# Patient Record
Sex: MALE | Race: WHITE | Employment: FULL TIME | URBAN - METROPOLITAN AREA
[De-identification: names, ages, dates, MRNs, and addresses within clinical notes are randomized per-mention and may not be internally consistent; named-entity substitution may affect disease eponyms.]

---

## 2017-05-24 ENCOUNTER — OFFICE VISIT (OUTPATIENT)
Dept: FAMILY MEDICINE CLINIC | Age: 57
End: 2017-05-24

## 2017-05-24 VITALS
HEART RATE: 66 BPM | WEIGHT: 209.6 LBS | SYSTOLIC BLOOD PRESSURE: 126 MMHG | HEIGHT: 70 IN | BODY MASS INDEX: 30.01 KG/M2 | DIASTOLIC BLOOD PRESSURE: 80 MMHG

## 2017-05-24 DIAGNOSIS — Z13.1 DIABETES MELLITUS SCREENING: ICD-10-CM

## 2017-05-24 DIAGNOSIS — Z13.220 LIPID SCREENING: ICD-10-CM

## 2017-05-24 DIAGNOSIS — Z00.00 ROUTINE PHYSICAL EXAMINATION: Primary | ICD-10-CM

## 2017-05-24 DIAGNOSIS — Z12.5 PROSTATE CANCER SCREENING: ICD-10-CM

## 2017-05-24 PROCEDURE — 99396 PREV VISIT EST AGE 40-64: CPT | Performed by: FAMILY MEDICINE

## 2017-05-24 ASSESSMENT — ENCOUNTER SYMPTOMS
ALLERGIC/IMMUNOLOGIC NEGATIVE: 1
GASTROINTESTINAL NEGATIVE: 1
EYES NEGATIVE: 1
RESPIRATORY NEGATIVE: 1

## 2017-10-24 DIAGNOSIS — Z13.1 DIABETES MELLITUS SCREENING: ICD-10-CM

## 2017-10-24 DIAGNOSIS — Z13.220 LIPID SCREENING: ICD-10-CM

## 2017-10-24 DIAGNOSIS — Z00.00 ROUTINE PHYSICAL EXAMINATION: ICD-10-CM

## 2017-10-24 LAB
A/G RATIO: 2.1 (ref 1.1–2.2)
ALBUMIN SERPL-MCNC: 4.6 G/DL (ref 3.4–5)
ALP BLD-CCNC: 53 U/L (ref 40–129)
ALT SERPL-CCNC: 19 U/L (ref 10–40)
ANION GAP SERPL CALCULATED.3IONS-SCNC: 14 MMOL/L (ref 3–16)
AST SERPL-CCNC: 22 U/L (ref 15–37)
BASOPHILS ABSOLUTE: 0 K/UL (ref 0–0.2)
BASOPHILS RELATIVE PERCENT: 0.6 %
BILIRUB SERPL-MCNC: 0.6 MG/DL (ref 0–1)
BUN BLDV-MCNC: 10 MG/DL (ref 7–20)
CALCIUM SERPL-MCNC: 9.1 MG/DL (ref 8.3–10.6)
CHLORIDE BLD-SCNC: 100 MMOL/L (ref 99–110)
CHOLESTEROL, TOTAL: 190 MG/DL (ref 0–199)
CO2: 28 MMOL/L (ref 21–32)
CREAT SERPL-MCNC: 0.9 MG/DL (ref 0.9–1.3)
EOSINOPHILS ABSOLUTE: 0.1 K/UL (ref 0–0.6)
EOSINOPHILS RELATIVE PERCENT: 0.9 %
GFR AFRICAN AMERICAN: >60
GFR NON-AFRICAN AMERICAN: >60
GLOBULIN: 2.2 G/DL
GLUCOSE BLD-MCNC: 76 MG/DL (ref 70–99)
HCT VFR BLD CALC: 45.1 % (ref 40.5–52.5)
HDLC SERPL-MCNC: 64 MG/DL (ref 40–60)
HEMOGLOBIN: 15.8 G/DL (ref 13.5–17.5)
LDL CHOLESTEROL CALCULATED: 107 MG/DL
LYMPHOCYTES ABSOLUTE: 1.7 K/UL (ref 1–5.1)
LYMPHOCYTES RELATIVE PERCENT: 25.8 %
MCH RBC QN AUTO: 32.5 PG (ref 26–34)
MCHC RBC AUTO-ENTMCNC: 35 G/DL (ref 31–36)
MCV RBC AUTO: 93 FL (ref 80–100)
MONOCYTES ABSOLUTE: 0.5 K/UL (ref 0–1.3)
MONOCYTES RELATIVE PERCENT: 7 %
NEUTROPHILS ABSOLUTE: 4.3 K/UL (ref 1.7–7.7)
NEUTROPHILS RELATIVE PERCENT: 65.7 %
PDW BLD-RTO: 13.4 % (ref 12.4–15.4)
PLATELET # BLD: 176 K/UL (ref 135–450)
PMV BLD AUTO: 7.5 FL (ref 5–10.5)
POTASSIUM SERPL-SCNC: 4.3 MMOL/L (ref 3.5–5.1)
PROSTATE SPECIFIC ANTIGEN: 0.6 NG/ML (ref 0–4)
RBC # BLD: 4.85 M/UL (ref 4.2–5.9)
SODIUM BLD-SCNC: 142 MMOL/L (ref 136–145)
TOTAL PROTEIN: 6.8 G/DL (ref 6.4–8.2)
TRIGL SERPL-MCNC: 95 MG/DL (ref 0–150)
TSH REFLEX: 2.69 UIU/ML (ref 0.27–4.2)
VLDLC SERPL CALC-MCNC: 19 MG/DL
WBC # BLD: 6.5 K/UL (ref 4–11)

## 2017-10-25 LAB
ESTIMATED AVERAGE GLUCOSE: 85.3 MG/DL
HBA1C MFR BLD: 4.6 %

## 2019-06-19 ENCOUNTER — OFFICE VISIT (OUTPATIENT)
Dept: FAMILY MEDICINE CLINIC | Age: 59
End: 2019-06-19
Payer: COMMERCIAL

## 2019-06-19 VITALS
BODY MASS INDEX: 30.83 KG/M2 | SYSTOLIC BLOOD PRESSURE: 138 MMHG | DIASTOLIC BLOOD PRESSURE: 80 MMHG | WEIGHT: 211.8 LBS | HEART RATE: 79 BPM | OXYGEN SATURATION: 99 %

## 2019-06-19 DIAGNOSIS — Z12.11 COLON CANCER SCREENING: ICD-10-CM

## 2019-06-19 DIAGNOSIS — H57.9 EYE DISORDER: Primary | ICD-10-CM

## 2019-06-19 PROCEDURE — 99213 OFFICE O/P EST LOW 20 MIN: CPT | Performed by: FAMILY MEDICINE

## 2019-06-19 ASSESSMENT — ENCOUNTER SYMPTOMS
ALLERGIC/IMMUNOLOGIC NEGATIVE: 1
GASTROINTESTINAL NEGATIVE: 1
RESPIRATORY NEGATIVE: 1

## 2019-06-19 ASSESSMENT — PATIENT HEALTH QUESTIONNAIRE - PHQ9
SUM OF ALL RESPONSES TO PHQ9 QUESTIONS 1 & 2: 0
1. LITTLE INTEREST OR PLEASURE IN DOING THINGS: 0
SUM OF ALL RESPONSES TO PHQ QUESTIONS 1-9: 0
SUM OF ALL RESPONSES TO PHQ QUESTIONS 1-9: 0
2. FEELING DOWN, DEPRESSED OR HOPELESS: 0

## 2019-06-19 NOTE — PROGRESS NOTES
SUBJECTIVE:  Patient ID: Elroy Templeton is a 63 y.o. male. Chief Complaint:  Chief Complaint   Patient presents with    Eye Drainage     watery left side x few months       HPI   63year old Male  Tear Left eye several week    No past medical history on file. 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 No current outpatient medications on file. No current facility-administered medications for this visit.       Lab Results   Component Value Date    WBC 6.5 10/24/2017    HGB 15.8 10/24/2017    HCT 45.1 10/24/2017    MCV 93.0 10/24/2017     10/24/2017     Lab Results   Component Value Date    CHOL 190 10/24/2017     Lab Results   Component Value Date    TRIG 95 10/24/2017     Lab Results   Component Value Date    HDL 64 (H) 10/24/2017     Lab Results   Component Value Date    LDLCALC 107 (H) 10/24/2017     Lab Results   Component Value Date    LABVLDL 19 10/24/2017     No results found for: [facility]    Chemistry        Component Value Date/Time     10/24/2017 1600    K 4.3 10/24/2017 1600     10/24/2017 1600    CO2 28 10/24/2017 1600    BUN 10 10/24/2017 1600    CREATININE 0.9 10/24/2017 1600        Component Value Date/Time    CALCIUM 9.1 10/24/2017 1600    ALKPHOS 53 10/24/2017 1600    AST 22 10/24/2017 1600    ALT 19 10/24/2017 1600    BILITOT 0.6 10/24/2017 1600          No results found for: TSHFT4, TSH  Lab Results   Component Value Date    PSA 0.60 10/24/2017     Lab Results   Component Value Date    LABA1C 4.6 10/24/2017     Lab Results   Component Value Date    EAG 85.3 10/24/2017         Review of Systems   Constitutional: Negative. HENT: Negative. Eyes:        Left eye +tearful   Respiratory: Negative. Cardiovascular: Negative. Gastrointestinal: Negative. Endocrine: Negative. Genitourinary: Negative. Allergic/Immunologic: Negative. Negative for environmental allergies. Neurological: Negative. Hematological: Negative. OBJECTIVE:  /80 (Site: Left Upper Arm, Position: Sitting, Cuff Size: Medium Adult)   Pulse 79   Wt 211 lb 12.8 oz (96.1 kg)   SpO2 99%   BMI 30.83 kg/m²   Physical Exam   Constitutional: He appears well-developed and well-nourished. No distress. HENT:   Head: Normocephalic. Eyes: Pupils are equal, round, and reactive to light. EOM are normal.   Watery Lt eye    Skin: He is not diaphoretic. Vitals reviewed. ASSESSMENT/PLAN:      Diagnosis Orders   1. Eye disorder     2. Colon cancer screening  CHITO Bell MD, Gastroenterology, [facility] Dr Jacquelin Ling    1.  Colon cancer screening Z12.11 CHITO Bell MD, Gastroenterology, [facility]

## 2020-08-12 ENCOUNTER — OFFICE VISIT (OUTPATIENT)
Dept: PRIMARY CARE CLINIC | Age: 60
End: 2020-08-12
Payer: COMMERCIAL

## 2020-08-12 VITALS
BODY MASS INDEX: 30.39 KG/M2 | HEART RATE: 73 BPM | RESPIRATION RATE: 14 BRPM | TEMPERATURE: 98.1 F | DIASTOLIC BLOOD PRESSURE: 87 MMHG | OXYGEN SATURATION: 98 % | WEIGHT: 208.8 LBS | SYSTOLIC BLOOD PRESSURE: 147 MMHG

## 2020-08-12 DIAGNOSIS — Z13.1 DIABETES MELLITUS SCREENING: ICD-10-CM

## 2020-08-12 DIAGNOSIS — Z00.00 ROUTINE PHYSICAL EXAMINATION: ICD-10-CM

## 2020-08-12 DIAGNOSIS — R19.7 DIARRHEA, UNSPECIFIED TYPE: ICD-10-CM

## 2020-08-12 DIAGNOSIS — Z13.220 LIPID SCREENING: ICD-10-CM

## 2020-08-12 DIAGNOSIS — Z12.5 PROSTATE CANCER SCREENING: ICD-10-CM

## 2020-08-12 DIAGNOSIS — E55.9 VITAMIN D DEFICIENCY: ICD-10-CM

## 2020-08-12 DIAGNOSIS — K58.0 IRRITABLE BOWEL SYNDROME WITH DIARRHEA: ICD-10-CM

## 2020-08-12 PROCEDURE — 99396 PREV VISIT EST AGE 40-64: CPT | Performed by: FAMILY MEDICINE

## 2020-08-12 ASSESSMENT — ENCOUNTER SYMPTOMS
CHEST TIGHTNESS: 0
CHOKING: 0
RHINORRHEA: 0
ABDOMINAL DISTENTION: 0
CONSTIPATION: 0
EYE DISCHARGE: 0
VOICE CHANGE: 0
BLOOD IN STOOL: 0
SINUS PAIN: 0
NAUSEA: 0
SHORTNESS OF BREATH: 0
PHOTOPHOBIA: 0
ALLERGIC/IMMUNOLOGIC NEGATIVE: 1
WHEEZING: 0
EYE ITCHING: 0
EYES NEGATIVE: 1
BACK PAIN: 1
TROUBLE SWALLOWING: 0
SINUS PRESSURE: 0
ABDOMINAL PAIN: 1
COLOR CHANGE: 0
SORE THROAT: 0
EYE REDNESS: 0
DIARRHEA: 1
APNEA: 0
COUGH: 0
EYE PAIN: 0
VOMITING: 0

## 2020-08-12 ASSESSMENT — PATIENT HEALTH QUESTIONNAIRE - PHQ9
1. LITTLE INTEREST OR PLEASURE IN DOING THINGS: 0
SUM OF ALL RESPONSES TO PHQ QUESTIONS 1-9: 0
SUM OF ALL RESPONSES TO PHQ QUESTIONS 1-9: 0
2. FEELING DOWN, DEPRESSED OR HOPELESS: 0
SUM OF ALL RESPONSES TO PHQ9 QUESTIONS 1 & 2: 0

## 2020-08-12 NOTE — PROGRESS NOTES
SUBJECTIVE:  Patient ID: Min Baumann is a 62 y.o. male. Chief Complaint:  Chief Complaint   Patient presents with    Abdominal Pain     gastro referral     Annual Exam       HPI   49year old Male   Annual  C/O abdominal discomfort & some diarrhea x few weeks    No past medical history on file. 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 No current outpatient medications on file. No current facility-administered medications for this visit. Lab Results   Component Value Date    WBC 6.5 10/24/2017    HGB 15.8 10/24/2017    HCT 45.1 10/24/2017    MCV 93.0 10/24/2017     10/24/2017     Lab Results   Component Value Date    CHOL 190 10/24/2017     Lab Results   Component Value Date    TRIG 95 10/24/2017     Lab Results   Component Value Date    HDL 64 (H) 10/24/2017     Lab Results   Component Value Date    LDLCALC 107 (H) 10/24/2017     Lab Results   Component Value Date    LABVLDL 19 10/24/2017     No results found for: [facility]    Chemistry        Component Value Date/Time     10/24/2017 1600    K 4.3 10/24/2017 1600     10/24/2017 1600    CO2 28 10/24/2017 1600    BUN 10 10/24/2017 1600    CREATININE 0.9 10/24/2017 1600        Component Value Date/Time    CALCIUM 9.1 10/24/2017 1600    ALKPHOS 53 10/24/2017 1600    AST 22 10/24/2017 1600    ALT 19 10/24/2017 1600    BILITOT 0.6 10/24/2017 1600            Review of Systems   Constitutional: Negative for activity change, appetite change, chills, diaphoresis, fatigue, fever and unexpected weight change. Activity  GYM  walk   HENT: Negative. Negative for congestion, ear discharge, ear pain, hearing loss, nosebleeds, postnasal drip, rhinorrhea, sinus pressure, sinus pain, sore throat, tinnitus, trouble swallowing and voice change. Eyes: Negative. Negative for photophobia, pain, discharge, redness, itching and visual disturbance. Respiratory: Negative for apnea, cough, choking, chest tightness, shortness of breath and wheezing. No Tobacco   Cardiovascular: Negative. Negative for chest pain, palpitations and leg swelling. Gastrointestinal: Positive for abdominal pain and diarrhea. Negative for abdominal distention, blood in stool, constipation, nausea and vomiting. Stomach issues  GI Dr Maria Isabel Hernández    Endocrine: Negative. Negative for cold intolerance, heat intolerance, polydipsia, polyphagia and polyuria. Genitourinary: Negative for dysuria, flank pain, frequency and urgency. Musculoskeletal: Positive for back pain. Negative for gait problem and neck pain. Hx Knee scope  Back   MRI stenosis ,arthritis ,scoliosis  Exercise   Skin: Negative for color change and rash. Allergic/Immunologic: Negative. Negative for environmental allergies and food allergies. Neurological: Negative for dizziness, tremors, light-headedness, numbness and headaches. Hematological: Negative. Psychiatric/Behavioral: Negative for agitation, behavioral problems, confusion, decreased concentration, self-injury and sleep disturbance. The patient is not nervous/anxious and is not hyperactive. OBJECTIVE:  BP (!) 147/87   Pulse 73   Temp 98.1 °F (36.7 °C) (Temporal)   Resp 14   Wt 208 lb 12.8 oz (94.7 kg)   SpO2 98%   BMI 30.39 kg/m²   Physical Exam  Vitals signs reviewed. Constitutional:       Appearance: He is well-developed. HENT:      Head: Normocephalic.       Right Ear: External ear normal.      Left Ear: External ear normal.      Nose: Nose normal.      Mouth/Throat:      Pharynx: No oropharyngeal exudate. Eyes:      Conjunctiva/sclera: Conjunctivae normal.      Pupils: Pupils are equal, round, and reactive to light. Neck:      Musculoskeletal: Normal range of motion and neck supple. Cardiovascular:      Rate and Rhythm: Normal rate and regular rhythm. Heart sounds: Normal heart sounds. No murmur. Pulmonary:      Effort: Pulmonary effort is normal.      Breath sounds: Normal breath sounds. Abdominal:      General: Bowel sounds are normal. There is no distension. Palpations: Abdomen is soft. There is no mass. Tenderness: There is no abdominal tenderness. There is no left CVA tenderness, guarding or rebound. Hernia: No hernia is present. Musculoskeletal: Normal range of motion. Skin:     General: Skin is warm. Neurological:      Mental Status: He is alert and oriented to person, place, and time. Deep Tendon Reflexes: Reflexes are normal and symmetric. Psychiatric:         Behavior: Behavior normal.         Thought Content: Thought content normal.         Judgment: Judgment normal.         ASSESSMENT/PLAN:      Diagnosis Orders   1. Routine physical examination  CBC Auto Differential    Comprehensive Metabolic Panel    Hemoglobin A1C    Lipid Panel    TSH without Reflex    Vitamin D 25 Hydroxy    Psa screening   2. Irritable bowel syndrome with diarrhea  CHITO - Devorah Meza MD, Gastroenterology, [facility]    CBC Auto Differential    Comprehensive Metabolic Panel    Amylase    Lipase   3. Vitamin D deficiency  Vitamin D 25 Hydroxy   4. Prostate cancer screening  Psa screening   5. Lipid screening  Lipid Panel   6. Diabetes mellitus screening  Hemoglobin A1C   7.  Diarrhea, unspecified type  CBC Auto Differential    Comprehensive Metabolic Panel    Amylase    Lipase     As above   BP recheck by MA

## 2020-08-13 LAB
A/G RATIO: 2 (ref 1.1–2.2)
ALBUMIN SERPL-MCNC: 4.7 G/DL (ref 3.4–5)
ALP BLD-CCNC: 47 U/L (ref 40–129)
ALT SERPL-CCNC: 22 U/L (ref 10–40)
AMYLASE: 69 U/L (ref 25–115)
ANION GAP SERPL CALCULATED.3IONS-SCNC: 13 MMOL/L (ref 3–16)
AST SERPL-CCNC: 25 U/L (ref 15–37)
BASOPHILS ABSOLUTE: 0.1 K/UL (ref 0–0.2)
BASOPHILS RELATIVE PERCENT: 1 %
BILIRUB SERPL-MCNC: 0.7 MG/DL (ref 0–1)
BUN BLDV-MCNC: 13 MG/DL (ref 7–20)
CALCIUM SERPL-MCNC: 9.6 MG/DL (ref 8.3–10.6)
CHLORIDE BLD-SCNC: 101 MMOL/L (ref 99–110)
CHOLESTEROL, TOTAL: 182 MG/DL (ref 0–199)
CO2: 27 MMOL/L (ref 21–32)
CREAT SERPL-MCNC: 1.1 MG/DL (ref 0.9–1.3)
EOSINOPHILS ABSOLUTE: 0.1 K/UL (ref 0–0.6)
EOSINOPHILS RELATIVE PERCENT: 0.8 %
ESTIMATED AVERAGE GLUCOSE: 93.9 MG/DL
GFR AFRICAN AMERICAN: >60
GFR NON-AFRICAN AMERICAN: >60
GLOBULIN: 2.3 G/DL
GLUCOSE BLD-MCNC: 95 MG/DL (ref 70–99)
HBA1C MFR BLD: 4.9 %
HCT VFR BLD CALC: 46.6 % (ref 40.5–52.5)
HDLC SERPL-MCNC: 75 MG/DL (ref 40–60)
HEMOGLOBIN: 16 G/DL (ref 13.5–17.5)
LDL CHOLESTEROL CALCULATED: 92 MG/DL
LIPASE: 41 U/L (ref 13–60)
LYMPHOCYTES ABSOLUTE: 1.9 K/UL (ref 1–5.1)
LYMPHOCYTES RELATIVE PERCENT: 25.9 %
MCH RBC QN AUTO: 31.6 PG (ref 26–34)
MCHC RBC AUTO-ENTMCNC: 34.3 G/DL (ref 31–36)
MCV RBC AUTO: 92.3 FL (ref 80–100)
MONOCYTES ABSOLUTE: 0.5 K/UL (ref 0–1.3)
MONOCYTES RELATIVE PERCENT: 7.1 %
NEUTROPHILS ABSOLUTE: 4.7 K/UL (ref 1.7–7.7)
NEUTROPHILS RELATIVE PERCENT: 65.2 %
PDW BLD-RTO: 13.5 % (ref 12.4–15.4)
PLATELET # BLD: 168 K/UL (ref 135–450)
PMV BLD AUTO: 7.3 FL (ref 5–10.5)
POTASSIUM SERPL-SCNC: 4.1 MMOL/L (ref 3.5–5.1)
PROSTATE SPECIFIC ANTIGEN: 0.67 NG/ML (ref 0–4)
RBC # BLD: 5.05 M/UL (ref 4.2–5.9)
SODIUM BLD-SCNC: 141 MMOL/L (ref 136–145)
TOTAL PROTEIN: 7 G/DL (ref 6.4–8.2)
TRIGL SERPL-MCNC: 73 MG/DL (ref 0–150)
TSH SERPL DL<=0.05 MIU/L-ACNC: 2.78 UIU/ML (ref 0.27–4.2)
VITAMIN D 25-HYDROXY: 59 NG/ML
VLDLC SERPL CALC-MCNC: 15 MG/DL
WBC # BLD: 7.3 K/UL (ref 4–11)

## 2021-04-15 ENCOUNTER — TELEPHONE (OUTPATIENT)
Dept: PRIMARY CARE CLINIC | Age: 61
End: 2021-04-15

## 2021-04-15 NOTE — TELEPHONE ENCOUNTER
Patient has hurt his back and is hoping that he can get a script for some steriods for the pain.      Thank you         [facility] [address], [address] [place] [address] [phone]

## 2021-09-16 ENCOUNTER — PATIENT MESSAGE (OUTPATIENT)
Dept: PRIMARY CARE CLINIC | Age: 61
End: 2021-09-16

## 2021-09-16 NOTE — TELEPHONE ENCOUNTER
From: Miriam Coleman  To: Lizzie William MD  Sent: 9/16/2021 10:01 AM EDT  Subject: Test Results Question    Dr. Philip Livers all is well. I have had 2 CT scans in the last month. Both confirming a kidney stone. The scan also showed a couple of other results that I have questions about. Is there a good time for a call to discuss?     Thank you,  Sylvie Wright  [phone]

## 2021-09-17 PROBLEM — M51.9 LUMBAR DISC DISEASE: Status: ACTIVE | Noted: 2021-08-17

## 2021-09-17 PROBLEM — N20.0 KIDNEY STONE: Status: ACTIVE | Noted: 2021-08-17

## 2021-09-17 PROBLEM — K57.30 COLON, DIVERTICULOSIS: Status: ACTIVE | Noted: 2021-08-17

## 2021-09-20 ENCOUNTER — OFFICE VISIT (OUTPATIENT)
Dept: PRIMARY CARE CLINIC | Age: 61
End: 2021-09-20
Payer: COMMERCIAL

## 2021-09-20 VITALS
DIASTOLIC BLOOD PRESSURE: 86 MMHG | BODY MASS INDEX: 27.38 KG/M2 | HEART RATE: 57 BPM | TEMPERATURE: 98.3 F | HEIGHT: 71 IN | SYSTOLIC BLOOD PRESSURE: 152 MMHG | OXYGEN SATURATION: 100 % | WEIGHT: 195.6 LBS

## 2021-09-20 DIAGNOSIS — Z00.00 ROUTINE PHYSICAL EXAMINATION: Primary | ICD-10-CM

## 2021-09-20 DIAGNOSIS — Z13.1 DIABETES MELLITUS SCREENING: ICD-10-CM

## 2021-09-20 DIAGNOSIS — Z13.220 LIPID SCREENING: ICD-10-CM

## 2021-09-20 DIAGNOSIS — Z12.5 PROSTATE CANCER SCREENING: ICD-10-CM

## 2021-09-20 DIAGNOSIS — N20.0 KIDNEY STONE: ICD-10-CM

## 2021-09-20 DIAGNOSIS — M89.8X5 LYTIC BONE LESION OF HIP: ICD-10-CM

## 2021-09-20 PROCEDURE — 99396 PREV VISIT EST AGE 40-64: CPT | Performed by: FAMILY MEDICINE

## 2021-09-20 RX ORDER — TAMSULOSIN HYDROCHLORIDE 0.4 MG/1
0.4 CAPSULE ORAL NIGHTLY
COMMUNITY
Start: 2021-08-25

## 2021-09-20 RX ORDER — ONDANSETRON 8 MG/1
8 TABLET, ORALLY DISINTEGRATING ORAL EVERY 8 HOURS PRN
COMMUNITY
Start: 2021-08-02

## 2021-09-20 SDOH — ECONOMIC STABILITY: TRANSPORTATION INSECURITY
IN THE PAST 12 MONTHS, HAS LACK OF TRANSPORTATION KEPT YOU FROM MEETINGS, WORK, OR FROM GETTING THINGS NEEDED FOR DAILY LIVING?: NO

## 2021-09-20 SDOH — ECONOMIC STABILITY: HOUSING INSECURITY: IN THE LAST 12 MONTHS, HOW MANY PLACES HAVE YOU LIVED?: 1

## 2021-09-20 SDOH — ECONOMIC STABILITY: TRANSPORTATION INSECURITY
IN THE PAST 12 MONTHS, HAS THE LACK OF TRANSPORTATION KEPT YOU FROM MEDICAL APPOINTMENTS OR FROM GETTING MEDICATIONS?: NO

## 2021-09-20 SDOH — ECONOMIC STABILITY: FOOD INSECURITY: WITHIN THE PAST 12 MONTHS, THE FOOD YOU BOUGHT JUST DIDN'T LAST AND YOU DIDN'T HAVE MONEY TO GET MORE.: NEVER TRUE

## 2021-09-20 SDOH — ECONOMIC STABILITY: HOUSING INSECURITY
IN THE LAST 12 MONTHS, WAS THERE A TIME WHEN YOU DID NOT HAVE A STEADY PLACE TO SLEEP OR SLEPT IN A SHELTER (INCLUDING NOW)?: NO

## 2021-09-20 SDOH — HEALTH STABILITY: PHYSICAL HEALTH: ON AVERAGE, HOW MANY DAYS PER WEEK DO YOU ENGAGE IN MODERATE TO STRENUOUS EXERCISE (LIKE A BRISK WALK)?: 3 DAYS

## 2021-09-20 SDOH — HEALTH STABILITY: PHYSICAL HEALTH: ON AVERAGE, HOW MANY MINUTES DO YOU ENGAGE IN EXERCISE AT THIS LEVEL?: 50 MIN

## 2021-09-20 SDOH — ECONOMIC STABILITY: FOOD INSECURITY: WITHIN THE PAST 12 MONTHS, YOU WORRIED THAT YOUR FOOD WOULD RUN OUT BEFORE YOU GOT MONEY TO BUY MORE.: NEVER TRUE

## 2021-09-20 SDOH — ECONOMIC STABILITY: INCOME INSECURITY: IN THE LAST 12 MONTHS, WAS THERE A TIME WHEN YOU WERE NOT ABLE TO PAY THE MORTGAGE OR RENT ON TIME?: NO

## 2021-09-20 ASSESSMENT — SOCIAL DETERMINANTS OF HEALTH (SDOH)
HOW HARD IS IT FOR YOU TO PAY FOR THE VERY BASICS LIKE FOOD, HOUSING, MEDICAL CARE, AND HEATING?: NOT HARD AT ALL
DO YOU BELONG TO ANY CLUBS OR ORGANIZATIONS SUCH AS CHURCH GROUPS UNIONS, FRATERNAL OR ATHLETIC GROUPS, OR SCHOOL GROUPS?: YES
HOW OFTEN DO YOU GET TOGETHER WITH FRIENDS OR RELATIVES?: MORE THAN THREE TIMES A WEEK
HOW OFTEN DO YOU ATTENT MEETINGS OF THE CLUB OR ORGANIZATION YOU BELONG TO?: MORE THAN 4 TIMES PER YEAR
IN A TYPICAL WEEK, HOW MANY TIMES DO YOU TALK ON THE PHONE WITH FAMILY, FRIENDS, OR NEIGHBORS?: MORE THAN THREE TIMES A WEEK
HOW OFTEN DO YOU ATTEND CHURCH OR RELIGIOUS SERVICES?: MORE THAN 4 TIMES PER YEAR

## 2021-09-20 ASSESSMENT — PATIENT HEALTH QUESTIONNAIRE - PHQ9
DEPRESSION UNABLE TO ASSESS: YES
SUM OF ALL RESPONSES TO PHQ QUESTIONS 1-9: 0
1. LITTLE INTEREST OR PLEASURE IN DOING THINGS: 0
1. LITTLE INTEREST OR PLEASURE IN DOING THINGS: NOT AT ALL
SUM OF ALL RESPONSES TO PHQ9 QUESTIONS 1 & 2: 0
SUM OF ALL RESPONSES TO PHQ QUESTIONS 1-9: 0
2. FEELING DOWN, DEPRESSED OR HOPELESS: NOT AT ALL
2. FEELING DOWN, DEPRESSED OR HOPELESS: 0
SUM OF ALL RESPONSES TO PHQ9 QUESTIONS 1 & 2: 0
SUM OF ALL RESPONSES TO PHQ QUESTIONS 1-9: 0

## 2021-09-20 ASSESSMENT — ENCOUNTER SYMPTOMS
RESPIRATORY NEGATIVE: 1
ALLERGIC/IMMUNOLOGIC NEGATIVE: 1
EYES NEGATIVE: 1
GASTROINTESTINAL NEGATIVE: 1

## 2021-09-20 ASSESSMENT — LIFESTYLE VARIABLES
HOW OFTEN DO YOU HAVE A DRINK CONTAINING ALCOHOL: 2-3 TIMES A WEEK
HOW MANY STANDARD DRINKS CONTAINING ALCOHOL DO YOU HAVE ON A TYPICAL DAY: 1 OR 2

## 2021-09-20 NOTE — PROGRESS NOTES
SUBJECTIVE:  Patient ID: Dilia Stoll is a 62 y.o. male. Chief Complaint:  Chief Complaint   Patient presents with    Nephrolithiasis    Annual Exam       HPI   62year old Male  Dx Kidney stone  Plus CT scan showed other abnormalities need to discuss  Annual    No past medical history on file. 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      Lab Results   Component Value Date    WBC 7.3 08/12/2020    HGB 16.0 08/12/2020    HCT 46.6 08/12/2020    MCV 92.3 08/12/2020     08/12/2020     Lab Results   Component Value Date    CHOL 182 08/12/2020    CHOL 190 10/24/2017     Lab Results   Component Value Date    TRIG 73 08/12/2020    TRIG 95 10/24/2017     Lab Results   Component Value Date    HDL 75 (H) 08/12/2020    HDL 64 (H) 10/24/2017     Lab Results   Component Value Date    LDLCALC 92 08/12/2020    LDLCALC 107 (H) 10/24/2017     Lab Results   Component Value Date    LABVLDL 15 08/12/2020    LABVLDL 19 10/24/2017     No results found for: [facility]    Chemistry        Component Value Date/Time     08/12/2020 1437    K 4.1 08/12/2020 1437     08/12/2020 1437    CO2 27 08/12/2020 1437    BUN 13 08/12/2020 1437    CREATININE 1.1 08/12/2020 1437        Component Value Date/Time    CALCIUM 9.6 08/12/2020 1437    ALKPHOS 47 08/12/2020 1437    AST 25 08/12/2020 1437    ALT 22 08/12/2020 1437    BILITOT 0.7 08/12/2020 1437        Lab Results   Component Value Date    PSA 0.67 08/12/2020    PSA 0.60 10/24/2017     Lab Results   Component Value Date    LABA1C 4.9 08/12/2020     Lab Results   Component Value Date    EAG 93.9 08/12/2020         Review of Systems   Constitutional: Negative. HENT: Negative. Eyes: Negative. Respiratory: Negative. CT ?tiny nodule Lower lung   Cardiovascular: Negative. Gastrointestinal: Negative. CT done @ [facility]  Liver cyst vs Hemangioma  Colon diverticulosis  EGD & Colonoscopy done 2020   Endocrine: Negative. Genitourinary:        Kidney stone  He did establish with Urology   Undergoing evaluation   Musculoskeletal:        CT done @  Lumbar Deg changes  Lytic lesion Ilium   Skin: Negative for rash. Allergic/Immunologic: Negative. Neurological: Negative. Negative for dizziness and headaches. Hematological: Negative. Psychiatric/Behavioral: Negative. Negative for behavioral problems, decreased concentration, dysphoric mood and sleep disturbance. The patient is not nervous/anxious. OBJECTIVE:  BP (!) 152/86 (Site: Left Upper Arm, Position: Sitting, Cuff Size: Medium Adult)   Pulse 57   Temp 98.3 °F (36.8 °C) (Oral)   Ht 5' 11\" (1.803 m)   Wt 195 lb 9.6 oz (88.7 kg)   SpO2 100%   BMI 27.28 kg/m²   Physical Exam  HENT:      Head: Normocephalic. Eyes:      Extraocular Movements: Extraocular movements intact. Pupils: Pupils are equal, round, and reactive to light. Cardiovascular:      Rate and Rhythm: Normal rate and regular rhythm. Heart sounds: Normal heart sounds. Pulmonary:      Effort: Pulmonary effort is normal.      Breath sounds: Normal breath sounds. No wheezing or rhonchi. Abdominal:      Palpations: Abdomen is soft.    Musculoskeletal: Cervical back: Normal range of motion and neck supple. Right lower leg: No edema. Left lower leg: No edema. Skin:     Findings: No rash. Neurological:      General: No focal deficit present. Mental Status: He is alert and oriented to person, place, and time. Psychiatric:         Mood and Affect: Mood normal.         Behavior: Behavior normal.         Thought Content: Thought content normal.         Judgment: Judgment normal.         ASSESSMENT/PLAN:      Diagnosis Orders   1. Routine physical examination  CBC Auto Differential    Comprehensive Metabolic Panel    Hemoglobin A1C    Lipid Panel    TSH without Reflex    PSA screening   2. Prostate cancer screening  PSA screening   3. Lipid screening  Lipid Panel   4. Diabetes mellitus screening  Hemoglobin A1C   5.  Lytic bone lesion of hip  NM BONE SCAN 3 PHASE   6. Kidney stone           Urology Dr Miki Pierce MD  Lab as above for update  Bone scan  covid vaccine done

## 2021-09-24 ENCOUNTER — HOSPITAL ENCOUNTER (OUTPATIENT)
Dept: NUCLEAR MEDICINE | Age: 61
Discharge: HOME | End: 2021-09-24
Payer: COMMERCIAL

## 2021-09-24 DIAGNOSIS — M89.8X5 LYTIC BONE LESION OF HIP: ICD-10-CM

## 2021-09-24 DIAGNOSIS — N20.0 KIDNEY STONE: Primary | ICD-10-CM

## 2021-09-24 PROCEDURE — 78315 BONE IMAGING 3 PHASE: CPT

## 2021-09-24 PROCEDURE — A9503 TC99M MEDRONATE: HCPCS | Performed by: FAMILY MEDICINE

## 2021-09-24 PROCEDURE — 3430000000 HC RX DIAGNOSTIC RADIOPHARMACEUTICAL: Performed by: FAMILY MEDICINE

## 2021-09-24 RX ORDER — TC 99M MEDRONATE 20 MG/10ML
25 INJECTION, POWDER, LYOPHILIZED, FOR SOLUTION INTRAVENOUS
Status: COMPLETED | OUTPATIENT
Start: 2021-09-24 | End: 2021-09-24

## 2021-09-24 RX ADMIN — TC 99M MEDRONATE 25 MILLICURIE: 20 INJECTION, POWDER, LYOPHILIZED, FOR SOLUTION INTRAVENOUS at 11:19

## 2021-12-15 ENCOUNTER — PATIENT MESSAGE (OUTPATIENT)
Dept: PRIMARY CARE CLINIC | Age: 61
End: 2021-12-15

## 2022-04-18 ENCOUNTER — OFFICE VISIT (OUTPATIENT)
Dept: PRIMARY CARE CLINIC | Age: 62
End: 2022-04-18
Payer: COMMERCIAL

## 2022-04-18 VITALS
HEIGHT: 71 IN | SYSTOLIC BLOOD PRESSURE: 132 MMHG | WEIGHT: 210.4 LBS | DIASTOLIC BLOOD PRESSURE: 82 MMHG | OXYGEN SATURATION: 99 % | HEART RATE: 60 BPM | TEMPERATURE: 97.9 F | BODY MASS INDEX: 29.46 KG/M2

## 2022-04-18 DIAGNOSIS — N52.9 ERECTILE DYSFUNCTION, UNSPECIFIED ERECTILE DYSFUNCTION TYPE: ICD-10-CM

## 2022-04-18 DIAGNOSIS — N42.9 PROSTATE DISORDER: ICD-10-CM

## 2022-04-18 DIAGNOSIS — N42.9 PROSTATE DISORDER: Primary | ICD-10-CM

## 2022-04-18 LAB
BILIRUBIN, POC: NORMAL
BLOOD URINE, POC: NORMAL
CLARITY, POC: CLEAR
COLOR, POC: YELLOW
GLUCOSE URINE, POC: NORMAL
KETONES, POC: NORMAL
LEUKOCYTE EST, POC: NORMAL
NITRITE, POC: NORMAL
PH, POC: 7
PROTEIN, POC: NORMAL
SPECIFIC GRAVITY, POC: 1.01
UROBILINOGEN, POC: 0.2

## 2022-04-18 PROCEDURE — 99213 OFFICE O/P EST LOW 20 MIN: CPT | Performed by: FAMILY MEDICINE

## 2022-04-18 PROCEDURE — 81002 URINALYSIS NONAUTO W/O SCOPE: CPT | Performed by: FAMILY MEDICINE

## 2022-04-18 ASSESSMENT — PATIENT HEALTH QUESTIONNAIRE - PHQ9
SUM OF ALL RESPONSES TO PHQ QUESTIONS 1-9: 0
SUM OF ALL RESPONSES TO PHQ QUESTIONS 1-9: 0
SUM OF ALL RESPONSES TO PHQ9 QUESTIONS 1 & 2: 0
DEPRESSION UNABLE TO ASSESS: YES
2. FEELING DOWN, DEPRESSED OR HOPELESS: NOT AT ALL
SUM OF ALL RESPONSES TO PHQ9 QUESTIONS 1 & 2: 0
1. LITTLE INTEREST OR PLEASURE IN DOING THINGS: NOT AT ALL
2. FEELING DOWN, DEPRESSED OR HOPELESS: 0
SUM OF ALL RESPONSES TO PHQ QUESTIONS 1-9: 0
SUM OF ALL RESPONSES TO PHQ QUESTIONS 1-9: 0
1. LITTLE INTEREST OR PLEASURE IN DOING THINGS: 0

## 2022-04-18 NOTE — PROGRESS NOTES
SUBJECTIVE:  Patient ID: Yash Cantu is a 65 y.o. male. Chief Complaint:  Chief Complaint   Patient presents with    Other     slow urine stream       HPI   65year old Male  ED  Urine stream is weak    No past medical history on file. 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      Lab Results   Component Value Date    WBC 5.8 09/21/2021    HGB 14.6 09/21/2021    HCT 41.9 09/21/2021    MCV 92.5 09/21/2021     09/21/2021     Lab Results   Component Value Date    CHOL 156 09/21/2021    CHOL 182 08/12/2020    CHOL 190 10/24/2017     Lab Results   Component Value Date    TRIG 34 09/21/2021    TRIG 73 08/12/2020    TRIG 95 10/24/2017     Lab Results   Component Value Date    HDL 87 (H) 09/21/2021    HDL 75 (H) 08/12/2020    HDL 64 (H) 10/24/2017     Lab Results   Component Value Date    LDLCALC 62 09/21/2021    LDLCALC 92 08/12/2020    LDLCALC 107 (H) 10/24/2017     Lab Results   Component Value Date    LABVLDL 7 09/21/2021    LABVLDL 15 08/12/2020    LABVLDL 19 10/24/2017     No results found for: [facility]    Chemistry        Component Value Date/Time  09/21/2021 1250    K 4.0 09/21/2021 1250     09/21/2021 1250    CO2 22 09/21/2021 1250    BUN 13 09/21/2021 1250    CREATININE 1.0 09/21/2021 1250        Component Value Date/Time    CALCIUM 9.3 09/21/2021 1250    ALKPHOS 48 09/21/2021 1250    AST 25 09/21/2021 1250    ALT 24 09/21/2021 1250    BILITOT 0.9 09/21/2021 1250            Review of Systems   Gastrointestinal:        Colonoscopy Dr Payal Chung   2021   Genitourinary:        ED  Slow stream  Once a while use Bathroom at night  Hx Kidney stone       OBJECTIVE:  /82 (Site: Right Upper Arm, Position: Sitting, Cuff Size: Medium Adult)   Pulse 60   Temp 97.9 °F (36.6 °C) (Infrared)   Ht 5' 11\" (1.803 m)   Wt 210 lb 6.4 oz (95.4 kg)   SpO2 99%   BMI 29.34 kg/m²   Physical Exam  Neurological:      General: No focal deficit present. Mental Status: He is alert and oriented to person, place, and time. ASSESSMENT/PLAN:      Diagnosis Orders   1. Prostate disorder  CHITO Dow MD, Urology, [facility]    Testosterone, free, total    POCT Urinalysis no Micro   2. Erectile dysfunction, unspecified erectile dysfunction type  CHITO Dow MD, Urology, [facility]    Testosterone, free, total     Referral urology  No follow-up provider specified.

## 2022-04-20 LAB
SEX HORMONE BINDING GLOBULIN: 34 NMOL/L (ref 11–80)
TESTOSTERONE FREE-NONMALE: 123.2 PG/ML (ref 47–244)
TESTOSTERONE TOTAL: 582 NG/DL (ref 220–1000)

## 2022-12-14 ENCOUNTER — OFFICE VISIT (OUTPATIENT)
Dept: PRIMARY CARE CLINIC | Age: 62
End: 2022-12-14
Payer: COMMERCIAL

## 2022-12-14 VITALS
BODY MASS INDEX: 27.89 KG/M2 | OXYGEN SATURATION: 98 % | SYSTOLIC BLOOD PRESSURE: 126 MMHG | DIASTOLIC BLOOD PRESSURE: 82 MMHG | HEART RATE: 65 BPM | TEMPERATURE: 97.4 F | WEIGHT: 200 LBS

## 2022-12-14 DIAGNOSIS — J40 BRONCHITIS: Primary | ICD-10-CM

## 2022-12-14 DIAGNOSIS — R68.89 FLU-LIKE SYMPTOMS: ICD-10-CM

## 2022-12-14 LAB
RAPID INFLUENZA  B AGN: NEGATIVE
RAPID INFLUENZA A AGN: NEGATIVE
RSV RAPID ANTIGEN: NEGATIVE

## 2022-12-14 PROCEDURE — 99213 OFFICE O/P EST LOW 20 MIN: CPT | Performed by: FAMILY MEDICINE

## 2022-12-14 RX ORDER — AZITHROMYCIN 250 MG/1
250 TABLET, FILM COATED ORAL SEE ADMIN INSTRUCTIONS
Qty: 6 TABLET | Refills: 0 | Status: SHIPPED | OUTPATIENT
Start: 2022-12-14 | End: 2022-12-19

## 2022-12-14 SDOH — ECONOMIC STABILITY: FOOD INSECURITY: WITHIN THE PAST 12 MONTHS, YOU WORRIED THAT YOUR FOOD WOULD RUN OUT BEFORE YOU GOT MONEY TO BUY MORE.: NEVER TRUE

## 2022-12-14 SDOH — ECONOMIC STABILITY: FOOD INSECURITY: WITHIN THE PAST 12 MONTHS, THE FOOD YOU BOUGHT JUST DIDN'T LAST AND YOU DIDN'T HAVE MONEY TO GET MORE.: NEVER TRUE

## 2022-12-14 ASSESSMENT — PATIENT HEALTH QUESTIONNAIRE - PHQ9
2. FEELING DOWN, DEPRESSED OR HOPELESS: 0
1. LITTLE INTEREST OR PLEASURE IN DOING THINGS: 0
SUM OF ALL RESPONSES TO PHQ QUESTIONS 1-9: 0
SUM OF ALL RESPONSES TO PHQ9 QUESTIONS 1 & 2: 0
SUM OF ALL RESPONSES TO PHQ QUESTIONS 1-9: 0

## 2022-12-14 ASSESSMENT — ENCOUNTER SYMPTOMS
NAUSEA: 0
COUGH: 1
ABDOMINAL PAIN: 0
VOMITING: 0
SORE THROAT: 1
DIARRHEA: 0
EYES NEGATIVE: 1

## 2022-12-14 ASSESSMENT — SOCIAL DETERMINANTS OF HEALTH (SDOH): HOW HARD IS IT FOR YOU TO PAY FOR THE VERY BASICS LIKE FOOD, HOUSING, MEDICAL CARE, AND HEATING?: NOT HARD AT ALL

## 2022-12-14 NOTE — PROGRESS NOTES
SUBJECTIVE:  Patient ID: Gorge Callaway is a 65 y.o. male. Chief Complaint:  Chief Complaint   Patient presents with    Congestion     Couging because of drainage, suspected sinusitis. HPI  65year old Male  Issue 12/9/2022  Congestion  Headache  Cough  No sore throat  No fever  No chills  No N/V/D  OTC sudafed   Sinus pressure in am  Mucous + dark in am when blow nose    No past medical history on file. 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      Lab Results   Component Value Date    WBC 4.2 09/08/2022    HGB 15.4 09/08/2022    HCT 43.9 09/08/2022    MCV 93.4 09/08/2022     09/08/2022     Lab Results   Component Value Date    CHOL 156 09/21/2021    CHOL 182 08/12/2020    CHOL 190 10/24/2017     Lab Results   Component Value Date    TRIG 34 09/21/2021    TRIG 73 08/12/2020    TRIG 95 10/24/2017     Lab Results   Component Value Date    HDL 87 (H) 09/21/2021    HDL 75 (H) 08/12/2020    HDL 64 (H) 10/24/2017     Lab Results   Component Value Date    LDLCALC 62 09/21/2021    LDLCALC 92 08/12/2020    LDLCALC 107 (H) 10/24/2017     Lab Results   Component Value Date LABVLDL 7 09/21/2021    LABVLDL 15 08/12/2020    LABVLDL 19 10/24/2017     No results found for: [facility]    Chemistry        Component Value Date/Time     09/08/2022 0944    K 4.4 09/08/2022 0944     09/08/2022 0944    CO2 29 09/08/2022 0944    BUN 11 09/08/2022 0944    CREATININE 1.0 09/08/2022 0944        Component Value Date/Time    CALCIUM 9.4 09/08/2022 0944    ALKPHOS 40 09/08/2022 0944    AST 30 09/08/2022 0944    ALT 27 09/08/2022 0944    BILITOT 0.7 09/08/2022 0944            Review of Systems   Constitutional:  Negative for chills, diaphoresis and fever. HENT:  Positive for congestion and sore throat. Eyes: Negative. Respiratory:  Positive for cough. Cardiovascular:  Negative for chest pain, palpitations and leg swelling. Gastrointestinal:  Negative for abdominal pain, diarrhea, nausea and vomiting. Genitourinary:  Negative for dysuria. Musculoskeletal:  Negative for gait problem. Neurological:  Negative for dizziness and headaches. OBJECTIVE:  /82 (Site: Left Upper Arm, Position: Sitting, Cuff Size: Small Adult)   Pulse 65   Temp 97.4 °F (36.3 °C) (Tympanic)   Wt 200 lb (90.7 kg)   SpO2 98%   BMI 27.89 kg/m²   Physical Exam  HENT:      Head: Normocephalic. Right Ear: Tympanic membrane normal.      Left Ear: Tympanic membrane normal.   Eyes:      Extraocular Movements: Extraocular movements intact. Pupils: Pupils are equal, round, and reactive to light. Cardiovascular:      Rate and Rhythm: Normal rate and regular rhythm. Pulses: Normal pulses. Heart sounds: Normal heart sounds. Pulmonary:      Breath sounds: Rhonchi present. No wheezing. Musculoskeletal:      Cervical back: Normal range of motion and neck supple. Neurological:      General: No focal deficit present. Mental Status: He is alert and oriented to person, place, and time. ASSESSMENT/PLAN:      Diagnosis Orders   1.  Bronchitis  azithromycin (ZITHROMAX) 250 MG tablet      2.  Flu-like symptoms  Rapid RSV Antigen    RAPID INFLUENZA A/B ANTIGENS    COVID-19    COVID-19    RAPID INFLUENZA A/B ANTIGENS    Rapid RSV Antigen        Test as above   Result pending  Rx as above  Flu vaccine done @[facility]  Covid x 3   Colonoscopy is due next Friday @[facility]

## 2022-12-15 LAB — SARS-COV-2: NOT DETECTED

## 2023-02-10 ENCOUNTER — TELEPHONE (OUTPATIENT)
Dept: PRIMARY CARE CLINIC | Age: 63
End: 2023-02-10

## 2023-02-10 NOTE — TELEPHONE ENCOUNTER
Pt called and stated he is experiencing diarrhea, he stated he is dehydrated that it is making his legs cramp and he thought it was coming to an end last night but continued this morning, he stated he is drinking Pedialyte, he wants to know if there is something else he could be doing.      I did offer an appointment but he refused he would just like advising     Please call and advise him

## 2023-04-07 ENCOUNTER — HOSPITAL ENCOUNTER (OUTPATIENT)
Dept: GENERAL RADIOLOGY | Age: 63
Discharge: HOME | End: 2023-04-07
Payer: COMMERCIAL

## 2023-04-07 ENCOUNTER — OFFICE VISIT (OUTPATIENT)
Dept: PRIMARY CARE CLINIC | Age: 63
End: 2023-04-07

## 2023-04-07 VITALS
WEIGHT: 198 LBS | OXYGEN SATURATION: 99 % | BODY MASS INDEX: 27.72 KG/M2 | RESPIRATION RATE: 16 BRPM | SYSTOLIC BLOOD PRESSURE: 124 MMHG | HEIGHT: 71 IN | HEART RATE: 66 BPM | TEMPERATURE: 96.9 F | DIASTOLIC BLOOD PRESSURE: 72 MMHG

## 2023-04-07 DIAGNOSIS — Z01.818 PRE-OPERATIVE CLEARANCE: ICD-10-CM

## 2023-04-07 DIAGNOSIS — M25.9 SHOULDER DISORDER: ICD-10-CM

## 2023-04-07 DIAGNOSIS — Z01.818 PRE-OP EXAM: Primary | ICD-10-CM

## 2023-04-07 PROCEDURE — 71046 X-RAY EXAM CHEST 2 VIEWS: CPT

## 2023-04-07 RX ORDER — CYANOCOBALAMIN 1000 UG/ML
1000 INJECTION, SOLUTION INTRAMUSCULAR; SUBCUTANEOUS ONCE
COMMUNITY

## 2023-04-07 RX ORDER — DICLOFENAC SODIUM 75 MG/1
TABLET, DELAYED RELEASE ORAL
COMMUNITY
Start: 2023-03-01

## 2023-04-07 SDOH — ECONOMIC STABILITY: INCOME INSECURITY: HOW HARD IS IT FOR YOU TO PAY FOR THE VERY BASICS LIKE FOOD, HOUSING, MEDICAL CARE, AND HEATING?: NOT HARD AT ALL

## 2023-04-07 SDOH — ECONOMIC STABILITY: FOOD INSECURITY: WITHIN THE PAST 12 MONTHS, THE FOOD YOU BOUGHT JUST DIDN'T LAST AND YOU DIDN'T HAVE MONEY TO GET MORE.: NEVER TRUE

## 2023-04-07 SDOH — ECONOMIC STABILITY: FOOD INSECURITY: WITHIN THE PAST 12 MONTHS, YOU WORRIED THAT YOUR FOOD WOULD RUN OUT BEFORE YOU GOT MONEY TO BUY MORE.: NEVER TRUE

## 2023-04-07 ASSESSMENT — PATIENT HEALTH QUESTIONNAIRE - PHQ9
7. TROUBLE CONCENTRATING ON THINGS, SUCH AS READING THE NEWSPAPER OR WATCHING TELEVISION: 0
3. TROUBLE FALLING OR STAYING ASLEEP: 0
2. FEELING DOWN, DEPRESSED OR HOPELESS: 0
SUM OF ALL RESPONSES TO PHQ9 QUESTIONS 1 & 2: 0
1. LITTLE INTEREST OR PLEASURE IN DOING THINGS: 0
SUM OF ALL RESPONSES TO PHQ QUESTIONS 1-9: 0
9. THOUGHTS THAT YOU WOULD BE BETTER OFF DEAD, OR OF HURTING YOURSELF: 0
8. MOVING OR SPEAKING SO SLOWLY THAT OTHER PEOPLE COULD HAVE NOTICED. OR THE OPPOSITE, BEING SO FIGETY OR RESTLESS THAT YOU HAVE BEEN MOVING AROUND A LOT MORE THAN USUAL: 0
4. FEELING TIRED OR HAVING LITTLE ENERGY: 0
5. POOR APPETITE OR OVEREATING: 0
6. FEELING BAD ABOUT YOURSELF - OR THAT YOU ARE A FAILURE OR HAVE LET YOURSELF OR YOUR FAMILY DOWN: 0
SUM OF ALL RESPONSES TO PHQ QUESTIONS 1-9: 0
10. IF YOU CHECKED OFF ANY PROBLEMS, HOW DIFFICULT HAVE THESE PROBLEMS MADE IT FOR YOU TO DO YOUR WORK, TAKE CARE OF THINGS AT HOME, OR GET ALONG WITH OTHER PEOPLE: 0

## 2023-04-07 ASSESSMENT — ENCOUNTER SYMPTOMS
ABDOMINAL PAIN: 0
BACK PAIN: 0
RESPIRATORY NEGATIVE: 1
ALLERGIC/IMMUNOLOGIC NEGATIVE: 1
APNEA: 0
SHORTNESS OF BREATH: 0
WHEEZING: 0

## 2023-04-07 NOTE — PROGRESS NOTES
SUBJECTIVE:  Patient ID: Clarissa Painting is a 59 y.o. male. Chief Complaint:  Chief Complaint   Patient presents with    Pre-op Exam     Left shoulder arthroscopy S40.18A  Dr María Elena Sparrow  04/19/2023    Fax [phone] [address] [facility]       HPI  59year old Male  Preoperative clearance  Left shoulder disorder x few years    No past medical history on file. 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      Lab Results   Component Value Date    WBC 4.2 09/08/2022    HGB 15.4 09/08/2022    HCT 43.9 09/08/2022    MCV 93.4 09/08/2022     09/08/2022     Lab Results   Component Value Date    CHOL 156 09/21/2021    CHOL 182 08/12/2020    CHOL 190 10/24/2017     Lab Results   Component Value Date    TRIG 34 09/21/2021    TRIG 73 08/12/2020    TRIG 95 10/24/2017     Lab Results   Component Value Date    HDL 87 (H) 09/21/2021    HDL 75 (H) 08/12/2020    HDL 64 (H) 10/24/2017     Lab Results   Component

## 2023-07-10 ENCOUNTER — OFFICE VISIT (OUTPATIENT)
Dept: PRIMARY CARE CLINIC | Age: 63
End: 2023-07-10
Payer: COMMERCIAL

## 2023-07-10 VITALS
BODY MASS INDEX: 27.48 KG/M2 | TEMPERATURE: 97.1 F | OXYGEN SATURATION: 99 % | DIASTOLIC BLOOD PRESSURE: 66 MMHG | HEART RATE: 65 BPM | WEIGHT: 197 LBS | SYSTOLIC BLOOD PRESSURE: 128 MMHG

## 2023-07-10 DIAGNOSIS — R19.7 DIARRHEA, UNSPECIFIED TYPE: Primary | ICD-10-CM

## 2023-07-10 PROCEDURE — 99213 OFFICE O/P EST LOW 20 MIN: CPT | Performed by: FAMILY MEDICINE

## 2023-07-10 RX ORDER — DIPHENOXYLATE HYDROCHLORIDE AND ATROPINE SULFATE 2.5; .025 MG/1; MG/1
1 TABLET ORAL 4 TIMES DAILY PRN
Qty: 40 TABLET | Refills: 0 | Status: SHIPPED | OUTPATIENT
Start: 2023-07-10 | End: 2023-07-20

## 2023-07-10 RX ORDER — UBIDECARENONE 75 MG
50 CAPSULE ORAL DAILY
COMMUNITY

## 2023-07-10 ASSESSMENT — ENCOUNTER SYMPTOMS
DIARRHEA: 1
RESPIRATORY NEGATIVE: 1
NAUSEA: 0
EYES NEGATIVE: 1
ABDOMINAL PAIN: 0
BLOOD IN STOOL: 0
VOMITING: 0

## 2023-07-10 NOTE — PROGRESS NOTES
wheezing or rhonchi. Abdominal:      General: There is no distension. Palpations: Abdomen is soft. Tenderness: There is no abdominal tenderness. There is no guarding or rebound. Musculoskeletal:      Cervical back: Normal range of motion and neck supple. Right lower leg: No edema. Left lower leg: No edema. Skin:     Findings: No rash. Neurological:      General: No focal deficit present. Mental Status: He is alert and oriented to person, place, and time. ASSESSMENT/PLAN:      Diagnosis Orders   1.  Diarrhea, unspecified type  diphenoxylate-atropine (LOMOTIL) 2.5-0.025 MG per tablet    [facility] - Blaise Dickerson MD, Gastroenterology, General acute hospital        Get office visit with GI as above  Rx as above  Previous record review done

## 2023-07-24 ENCOUNTER — PATIENT MESSAGE (OUTPATIENT)
Dept: PRIMARY CARE CLINIC | Age: 63
End: 2023-07-24

## 2023-07-24 NOTE — TELEPHONE ENCOUNTER
From: Chucho Villa  To: Dr. Briscoe Ast: 7/24/2023 4:28 PM EDT  Subject: Ramirez Speaks    Dr. Yue Chun,      Thank you for looking after Mom. When they come in again can you talk to her about physical therapy.     Thank you,      Coral Cope

## 2023-09-13 ENCOUNTER — OFFICE VISIT (OUTPATIENT)
Dept: PRIMARY CARE CLINIC | Age: 63
End: 2023-09-13
Payer: COMMERCIAL

## 2023-09-13 VITALS
TEMPERATURE: 97.3 F | HEIGHT: 70 IN | BODY MASS INDEX: 26.8 KG/M2 | OXYGEN SATURATION: 98 % | SYSTOLIC BLOOD PRESSURE: 132 MMHG | WEIGHT: 187.2 LBS | DIASTOLIC BLOOD PRESSURE: 88 MMHG | HEART RATE: 62 BPM

## 2023-09-13 DIAGNOSIS — Z01.818 PRE-OP EXAM: Primary | ICD-10-CM

## 2023-09-13 DIAGNOSIS — Z12.5 PROSTATE CANCER SCREENING: ICD-10-CM

## 2023-09-13 DIAGNOSIS — M17.11 PRIMARY OSTEOARTHRITIS OF RIGHT KNEE: ICD-10-CM

## 2023-09-13 PROCEDURE — 99242 OFF/OP CONSLTJ NEW/EST SF 20: CPT | Performed by: FAMILY MEDICINE

## 2023-09-13 ASSESSMENT — ENCOUNTER SYMPTOMS
ABDOMINAL PAIN: 0
EYES NEGATIVE: 1
ALLERGIC/IMMUNOLOGIC NEGATIVE: 1
CHEST TIGHTNESS: 0
SHORTNESS OF BREATH: 0
SINUS PAIN: 0
BLOOD IN STOOL: 0
SORE THROAT: 0
VOMITING: 0
WHEEZING: 0
RHINORRHEA: 0
DIARRHEA: 0
NAUSEA: 0
APNEA: 0
BACK PAIN: 0
RESPIRATORY NEGATIVE: 1

## 2023-09-13 NOTE — PROGRESS NOTES
SUBJECTIVE:  Patient ID: Summer Brown is a 59 y.o. male. Chief Complaint:  Chief Complaint   Patient presents with    Pre-op Exam     Right Knee Replacement on 10/11/2023 by Dr Smooth Thompson at [facility] [phone]       [facility]  59year old Male  Hx Osteoarthritis  Right Knee Arthroplasty     Past Medical History:   Diagnosis Date    Arthritis     Knee bilateral     Past Surgical History:   Procedure Laterality Date    KNEE ARTHROSCOPY Right     x2    KNEE ARTHROSCOPY Left     x1    SHOULDER ARTHROSCOPY Bilateral      No Known Allergies    Family History   Problem Relation Age of Onset    Hypertension Mother     Hypertension Father     Prostate Cancer Brother     Mult Sclerosis Brother      Social History     Social History Narrative        No tobacco    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    Wife Pt job care giver memory facility    Son 32 graduate [employer]  job in [address]    Son 25  [employer] + Master degree [place] [employer]       Patient Active Problem List   Diagnosis    Kidney stone    Lumbar disc disease    Colon, diverticulosis     Current Outpatient Medications   Medication Sig Dispense Refill    vitamin B-12 (CYANOCOBALAMIN) 100 MCG tablet Take 0.5 tablets by mouth daily      diclofenac (VOLTAREN) 75 MG EC tablet       MAGNESIUM PO Take by mouth      Zinc Sulfate 66 MG TABS Take by mouth      VITAMIN D PO Take by mouth       No current facility-administered medications for this visit.      Lab Results   Component Value Date    WBC 4.2 09/08/2022    HGB 15.4 09/08/2022    HCT 43.9 09/08/2022    MCV 93.4 09/08/2022     09/08/2022     Lab Results   Component Value Date    CHOL 156 09/21/2021    CHOL 182 08/12/2020    CHOL 190 10/24/2017     Lab Results   Component Value Date    TRIG 34 09/21/2021    TRIG 73 08/12/2020    TRIG 95 10/24/2017     Lab Results   Component Value Date    HDL 87 (H) 09/21/2021    HDL 75 (H) 08/12/2020    HDL 64 (H)

## 2023-09-14 DIAGNOSIS — Z01.818 PRE-OP EXAM: ICD-10-CM

## 2023-09-14 DIAGNOSIS — Z12.5 PROSTATE CANCER SCREENING: ICD-10-CM

## 2023-09-14 LAB
ALBUMIN SERPL-MCNC: 4.6 G/DL (ref 3.4–5)
ALBUMIN/GLOB SERPL: 2.7 {RATIO} (ref 1.1–2.2)
ALP SERPL-CCNC: 54 U/L (ref 40–129)
ALT SERPL-CCNC: 49 U/L (ref 10–40)
ANION GAP SERPL CALCULATED.3IONS-SCNC: 10 MMOL/L (ref 3–16)
APTT BLD: 25.1 SEC (ref 22.7–35.9)
AST SERPL-CCNC: 50 U/L (ref 15–37)
BASOPHILS # BLD: 0.1 K/UL (ref 0–0.2)
BASOPHILS NFR BLD: 0.7 %
BILIRUB SERPL-MCNC: 0.5 MG/DL (ref 0–1)
BUN SERPL-MCNC: 13 MG/DL (ref 7–20)
CALCIUM SERPL-MCNC: 9.6 MG/DL (ref 8.3–10.6)
CHLORIDE SERPL-SCNC: 101 MMOL/L (ref 99–110)
CO2 SERPL-SCNC: 28 MMOL/L (ref 21–32)
CREAT SERPL-MCNC: 1.5 MG/DL (ref 0.8–1.3)
DEPRECATED RDW RBC AUTO: 13.7 % (ref 12.4–15.4)
EOSINOPHIL # BLD: 0 K/UL (ref 0–0.6)
EOSINOPHIL NFR BLD: 0.5 %
GFR SERPLBLD CREATININE-BSD FMLA CKD-EPI: 52 ML/MIN/{1.73_M2}
GLUCOSE SERPL-MCNC: 75 MG/DL (ref 70–99)
HCT VFR BLD AUTO: 44.3 % (ref 40.5–52.5)
HGB BLD-MCNC: 15.1 G/DL (ref 13.5–17.5)
INR PPP: 0.9 (ref 0.84–1.16)
LYMPHOCYTES # BLD: 1.3 K/UL (ref 1–5.1)
LYMPHOCYTES NFR BLD: 14.6 %
MCH RBC QN AUTO: 33.5 PG (ref 26–34)
MCHC RBC AUTO-ENTMCNC: 34.1 G/DL (ref 31–36)
MCV RBC AUTO: 98.3 FL (ref 80–100)
MONOCYTES # BLD: 0.6 K/UL (ref 0–1.3)
MONOCYTES NFR BLD: 6.7 %
NEUTROPHILS # BLD: 6.6 K/UL (ref 1.7–7.7)
NEUTROPHILS NFR BLD: 77.5 %
PLATELET # BLD AUTO: 190 K/UL (ref 135–450)
PMV BLD AUTO: 7.1 FL (ref 5–10.5)
POTASSIUM SERPL-SCNC: 4.4 MMOL/L (ref 3.5–5.1)
PROT SERPL-MCNC: 6.3 G/DL (ref 6.4–8.2)
PROTHROMBIN TIME: 12.1 SEC (ref 11.5–14.8)
PSA SERPL DL<=0.01 NG/ML-MCNC: 1.05 NG/ML (ref 0–4)
RBC # BLD AUTO: 4.51 M/UL (ref 4.2–5.9)
SODIUM SERPL-SCNC: 139 MMOL/L (ref 136–145)
WBC # BLD AUTO: 8.6 K/UL (ref 4–11)

## 2023-09-16 DIAGNOSIS — Z01.818 PREOPERATIVE CLEARANCE: Primary | ICD-10-CM

## 2023-10-03 DIAGNOSIS — Z01.818 PREOPERATIVE CLEARANCE: ICD-10-CM

## 2023-10-03 LAB
ALBUMIN SERPL-MCNC: 4.5 G/DL (ref 3.4–5)
ALBUMIN/GLOB SERPL: 2.4 {RATIO} (ref 1.1–2.2)
ALP SERPL-CCNC: 51 U/L (ref 40–129)
ALT SERPL-CCNC: 24 U/L (ref 10–40)
ANION GAP SERPL CALCULATED.3IONS-SCNC: 9 MMOL/L (ref 3–16)
AST SERPL-CCNC: 31 U/L (ref 15–37)
BILIRUB SERPL-MCNC: 0.9 MG/DL (ref 0–1)
BUN SERPL-MCNC: 11 MG/DL (ref 7–20)
CALCIUM SERPL-MCNC: 8.6 MG/DL (ref 8.3–10.6)
CHLORIDE SERPL-SCNC: 97 MMOL/L (ref 99–110)
CO2 SERPL-SCNC: 29 MMOL/L (ref 21–32)
CREAT SERPL-MCNC: 1.2 MG/DL (ref 0.8–1.3)
GFR SERPLBLD CREATININE-BSD FMLA CKD-EPI: >60 ML/MIN/{1.73_M2}
GLUCOSE SERPL-MCNC: 86 MG/DL (ref 70–99)
POTASSIUM SERPL-SCNC: 3.7 MMOL/L (ref 3.5–5.1)
PROT SERPL-MCNC: 6.4 G/DL (ref 6.4–8.2)
SODIUM SERPL-SCNC: 135 MMOL/L (ref 136–145)

## 2023-11-09 ENCOUNTER — PATIENT MESSAGE (OUTPATIENT)
Dept: PRIMARY CARE CLINIC | Age: 63
End: 2023-11-09

## 2023-11-09 NOTE — TELEPHONE ENCOUNTER
From: Jeanette Pineda  To: Dr. Taylor Lieu: 11/9/2023 9:29 AM EST  Subject: [facility] Savita Hill,    Started Wednesday night w fever, continued yesterday. Mostly cold symptoms. 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 Is there an antibiotic you could send or another type of medicine that will help with these symptoms?     Thank you,      Jacques Barney  [phone]

## 2024-01-03 ENCOUNTER — PATIENT MESSAGE (OUTPATIENT)
Dept: PRIMARY CARE CLINIC | Age: 64
End: 2024-01-03

## 2024-01-03 NOTE — TELEPHONE ENCOUNTER
From: Frederick Stacy  To: Dr. Tessie Boykin  Sent: 1/3/2024 4:59 PM EST  Subject: Frederick Boykin,      Does you office administer the Shingles vaccine?      Thank you,    Frederick

## 2024-01-12 ENCOUNTER — NURSE ONLY (OUTPATIENT)
Dept: PRIMARY CARE CLINIC | Age: 64
End: 2024-01-12
Payer: COMMERCIAL

## 2024-01-12 DIAGNOSIS — Z23 NEED FOR SHINGLES VACCINE: Primary | ICD-10-CM

## 2024-01-12 PROCEDURE — 90750 HZV VACC RECOMBINANT IM: CPT | Performed by: FAMILY MEDICINE

## 2024-01-12 PROCEDURE — 90471 IMMUNIZATION ADMIN: CPT | Performed by: FAMILY MEDICINE

## 2024-05-02 SDOH — ECONOMIC STABILITY: INCOME INSECURITY: HOW HARD IS IT FOR YOU TO PAY FOR THE VERY BASICS LIKE FOOD, HOUSING, MEDICAL CARE, AND HEATING?: NOT VERY HARD

## 2024-05-02 SDOH — ECONOMIC STABILITY: FOOD INSECURITY: WITHIN THE PAST 12 MONTHS, YOU WORRIED THAT YOUR FOOD WOULD RUN OUT BEFORE YOU GOT MONEY TO BUY MORE.: NEVER TRUE

## 2024-05-02 SDOH — ECONOMIC STABILITY: FOOD INSECURITY: WITHIN THE PAST 12 MONTHS, THE FOOD YOU BOUGHT JUST DIDN'T LAST AND YOU DIDN'T HAVE MONEY TO GET MORE.: NEVER TRUE

## 2024-05-03 ENCOUNTER — OFFICE VISIT (OUTPATIENT)
Dept: PRIMARY CARE CLINIC | Age: 64
End: 2024-05-03
Payer: COMMERCIAL

## 2024-05-03 VITALS
SYSTOLIC BLOOD PRESSURE: 122 MMHG | BODY MASS INDEX: 26.89 KG/M2 | DIASTOLIC BLOOD PRESSURE: 84 MMHG | TEMPERATURE: 97.2 F | WEIGHT: 187.4 LBS | OXYGEN SATURATION: 98 % | HEART RATE: 55 BPM

## 2024-05-03 DIAGNOSIS — G89.29 CHRONIC MIDLINE LOW BACK PAIN WITHOUT SCIATICA: ICD-10-CM

## 2024-05-03 DIAGNOSIS — R19.7 DIARRHEA, UNSPECIFIED TYPE: Primary | ICD-10-CM

## 2024-05-03 DIAGNOSIS — M54.50 CHRONIC MIDLINE LOW BACK PAIN WITHOUT SCIATICA: ICD-10-CM

## 2024-05-03 PROCEDURE — 99214 OFFICE O/P EST MOD 30 MIN: CPT | Performed by: FAMILY MEDICINE

## 2024-05-03 RX ORDER — METHYLPREDNISOLONE 4 MG/1
TABLET ORAL
Qty: 1 KIT | Refills: 0 | Status: SHIPPED | OUTPATIENT
Start: 2024-05-03 | End: 2024-05-09

## 2024-05-03 ASSESSMENT — PATIENT HEALTH QUESTIONNAIRE - PHQ9
SUM OF ALL RESPONSES TO PHQ9 QUESTIONS 1 & 2: 0
SUM OF ALL RESPONSES TO PHQ QUESTIONS 1-9: 0
SUM OF ALL RESPONSES TO PHQ QUESTIONS 1-9: 0
2. FEELING DOWN, DEPRESSED OR HOPELESS: NOT AT ALL
1. LITTLE INTEREST OR PLEASURE IN DOING THINGS: NOT AT ALL
SUM OF ALL RESPONSES TO PHQ QUESTIONS 1-9: 0
SUM OF ALL RESPONSES TO PHQ QUESTIONS 1-9: 0

## 2024-05-03 ASSESSMENT — ENCOUNTER SYMPTOMS
BACK PAIN: 1
EYES NEGATIVE: 1
DIARRHEA: 1
RESPIRATORY NEGATIVE: 1
ALLERGIC/IMMUNOLOGIC NEGATIVE: 1

## 2024-05-03 NOTE — PROGRESS NOTES
kg/m²   Physical Exam  HENT:      Head: Normocephalic and atraumatic.   Cardiovascular:      Rate and Rhythm: Normal rate and regular rhythm.      Pulses: Normal pulses.      Heart sounds: Normal heart sounds.   Pulmonary:      Effort: Pulmonary effort is normal.      Breath sounds: Normal breath sounds.   Abdominal:      Palpations: Abdomen is soft.      Tenderness: There is no abdominal tenderness. There is no guarding or rebound.      Hernia: No hernia is present.   Musculoskeletal:      Comments: RT knee repalcement   Neurological:      General: No focal deficit present.      Mental Status: He is alert and oriented to person, place, and time.         ASSESSMENT/PLAN:      Diagnosis Orders   1. Diarrhea, unspecified type  CT ABDOMEN PELVIS W WO CONTRAST Additional Contrast? Radiologist Recommendation (IV)    Comprehensive Metabolic Panel    CBC with Auto Differential    Lipase      2. Chronic midline low back pain without sciatica  methylPREDNISolone (MEDROL DOSEPACK) 4 MG tablet        As above

## 2024-05-10 DIAGNOSIS — R19.7 DIARRHEA, UNSPECIFIED TYPE: ICD-10-CM

## 2024-05-10 LAB
ALBUMIN SERPL-MCNC: 4.2 G/DL (ref 3.4–5)
ALBUMIN/GLOB SERPL: 2 {RATIO} (ref 1.1–2.2)
ALP SERPL-CCNC: 54 U/L (ref 40–129)
ALT SERPL-CCNC: 20 U/L (ref 10–40)
ANION GAP SERPL CALCULATED.3IONS-SCNC: 9 MMOL/L (ref 3–16)
AST SERPL-CCNC: 22 U/L (ref 15–37)
BASOPHILS # BLD: 0.1 K/UL (ref 0–0.2)
BASOPHILS NFR BLD: 1 %
BILIRUB SERPL-MCNC: 0.7 MG/DL (ref 0–1)
BUN SERPL-MCNC: 13 MG/DL (ref 7–20)
CALCIUM SERPL-MCNC: 9.6 MG/DL (ref 8.3–10.6)
CHLORIDE SERPL-SCNC: 102 MMOL/L (ref 99–110)
CO2 SERPL-SCNC: 32 MMOL/L (ref 21–32)
CREAT SERPL-MCNC: 1.2 MG/DL (ref 0.8–1.3)
DEPRECATED RDW RBC AUTO: 13.5 % (ref 12.4–15.4)
EOSINOPHIL # BLD: 0.1 K/UL (ref 0–0.6)
EOSINOPHIL NFR BLD: 1 %
GFR SERPLBLD CREATININE-BSD FMLA CKD-EPI: 68 ML/MIN/{1.73_M2}
GLUCOSE SERPL-MCNC: 78 MG/DL (ref 70–99)
HCT VFR BLD AUTO: 44.8 % (ref 40.5–52.5)
HGB BLD-MCNC: 15.7 G/DL (ref 13.5–17.5)
LIPASE SERPL-CCNC: 44 U/L (ref 13–60)
LYMPHOCYTES # BLD: 1.7 K/UL (ref 1–5.1)
LYMPHOCYTES NFR BLD: 25.6 %
MCH RBC QN AUTO: 32.8 PG (ref 26–34)
MCHC RBC AUTO-ENTMCNC: 35 G/DL (ref 31–36)
MCV RBC AUTO: 93.7 FL (ref 80–100)
MONOCYTES # BLD: 0.6 K/UL (ref 0–1.3)
MONOCYTES NFR BLD: 9 %
NEUTROPHILS # BLD: 4.2 K/UL (ref 1.7–7.7)
NEUTROPHILS NFR BLD: 63.4 %
PLATELET # BLD AUTO: 252 K/UL (ref 135–450)
PMV BLD AUTO: 6.7 FL (ref 5–10.5)
POTASSIUM SERPL-SCNC: 4.3 MMOL/L (ref 3.5–5.1)
PROT SERPL-MCNC: 6.3 G/DL (ref 6.4–8.2)
RBC # BLD AUTO: 4.78 M/UL (ref 4.2–5.9)
SODIUM SERPL-SCNC: 143 MMOL/L (ref 136–145)
WBC # BLD AUTO: 6.5 K/UL (ref 4–11)

## 2024-06-07 DIAGNOSIS — R19.7 DIARRHEA, UNSPECIFIED TYPE: Primary | ICD-10-CM

## 2024-10-15 ENCOUNTER — HOSPITAL ENCOUNTER (EMERGENCY)
Age: 64
Discharge: HOME | End: 2024-10-15
Attending: EMERGENCY MEDICINE
Payer: COMMERCIAL

## 2024-10-15 ENCOUNTER — APPOINTMENT (OUTPATIENT)
Age: 64
End: 2024-10-15
Payer: COMMERCIAL

## 2024-10-15 ENCOUNTER — HOSPITAL ENCOUNTER (OUTPATIENT)
Age: 64
Discharge: HOME | End: 2024-10-15
Payer: COMMERCIAL

## 2024-10-15 ENCOUNTER — CLINICAL DOCUMENTATION (OUTPATIENT)
Dept: PRIMARY CARE CLINIC | Age: 64
End: 2024-10-15

## 2024-10-15 ENCOUNTER — TELEPHONE (OUTPATIENT)
Dept: PRIMARY CARE CLINIC | Age: 64
End: 2024-10-15

## 2024-10-15 VITALS
DIASTOLIC BLOOD PRESSURE: 88 MMHG | SYSTOLIC BLOOD PRESSURE: 133 MMHG | RESPIRATION RATE: 18 BRPM | WEIGHT: 189 LBS | HEIGHT: 71 IN | HEART RATE: 88 BPM | BODY MASS INDEX: 26.46 KG/M2 | OXYGEN SATURATION: 99 % | TEMPERATURE: 97.9 F

## 2024-10-15 DIAGNOSIS — R93.89 ABNORMAL MRI: ICD-10-CM

## 2024-10-15 DIAGNOSIS — R93.89 ABNORMAL MRI: Primary | ICD-10-CM

## 2024-10-15 DIAGNOSIS — I71.02 DISSECTION OF ABDOMINAL AORTA (HCC): Primary | ICD-10-CM

## 2024-10-15 LAB
ANION GAP SERPL CALCULATED.3IONS-SCNC: 11 MMOL/L (ref 3–16)
BASOPHILS # BLD: 0.04 K/UL (ref 0–0.2)
BASOPHILS NFR BLD: 1 %
BUN SERPL-MCNC: 18 MG/DL (ref 7–20)
CALCIUM SERPL-MCNC: 9.6 MG/DL (ref 8.3–10.6)
CHLORIDE SERPL-SCNC: 99 MMOL/L (ref 99–110)
CO2 SERPL-SCNC: 30 MMOL/L (ref 21–32)
CREAT SERPL-MCNC: 1.1 MG/DL (ref 0.8–1.3)
EGFR, POC: 75 ML/MIN/1.73M2
EOSINOPHIL # BLD: 0.06 K/UL (ref 0–0.6)
EOSINOPHILS RELATIVE PERCENT: 1 %
ERYTHROCYTE [DISTWIDTH] IN BLOOD BY AUTOMATED COUNT: 13.2 % (ref 12.4–15.4)
GFR, ESTIMATED: 80 ML/MIN/1.73M2
GLUCOSE SERPL-MCNC: 164 MG/DL (ref 70–99)
HCT VFR BLD AUTO: 43.3 % (ref 40.5–52.5)
HGB BLD-MCNC: 15 G/DL (ref 13.5–17.5)
IMM GRANULOCYTES # BLD AUTO: 0.01 K/UL (ref 0–0.5)
IMM GRANULOCYTES NFR BLD: 0 %
LYMPHOCYTES NFR BLD: 1.44 K/UL (ref 1–5.1)
LYMPHOCYTES RELATIVE PERCENT: 22 %
MCH RBC QN AUTO: 32.2 PG (ref 26–34)
MCHC RBC AUTO-ENTMCNC: 34.6 G/DL (ref 31–36)
MCV RBC AUTO: 92.9 FL (ref 80–100)
MONOCYTES NFR BLD: 0.48 K/UL (ref 0–1.3)
MONOCYTES NFR BLD: 7 %
NEUTROPHILS NFR BLD: 69 %
NEUTS SEG NFR BLD: 4.47 K/UL (ref 1.7–7.7)
PLATELET # BLD AUTO: 179 K/UL (ref 135–450)
PMV BLD AUTO: 8.8 FL (ref 9.4–12.4)
POC CREATININE: 1.1 MG/DL (ref 0.8–1.3)
POTASSIUM SERPL-SCNC: 4.1 MMOL/L (ref 3.5–5.1)
RBC # BLD AUTO: 4.66 M/UL (ref 4.2–5.9)
SODIUM SERPL-SCNC: 140 MMOL/L (ref 136–145)
TROPONIN I SERPL HS-MCNC: 10 NG/L (ref 0–22)
WBC OTHER # BLD: 6.5 K/UL (ref 4–11)

## 2024-10-15 PROCEDURE — 85025 COMPLETE CBC W/AUTO DIFF WBC: CPT

## 2024-10-15 PROCEDURE — 82565 ASSAY OF CREATININE: CPT

## 2024-10-15 PROCEDURE — 80048 BASIC METABOLIC PNL TOTAL CA: CPT

## 2024-10-15 PROCEDURE — 84484 ASSAY OF TROPONIN QUANT: CPT

## 2024-10-15 PROCEDURE — 71045 X-RAY EXAM CHEST 1 VIEW: CPT

## 2024-10-15 PROCEDURE — 6360000004 HC RX CONTRAST MEDICATION: Performed by: FAMILY MEDICINE

## 2024-10-15 PROCEDURE — 74174 CTA ABD&PLVS W/CONTRAST: CPT

## 2024-10-15 PROCEDURE — 99285 EMERGENCY DEPT VISIT HI MDM: CPT

## 2024-10-15 PROCEDURE — 93005 ELECTROCARDIOGRAM TRACING: CPT

## 2024-10-15 RX ORDER — AMLODIPINE BESYLATE 5 MG/1
5 TABLET ORAL DAILY
Qty: 30 TABLET | Refills: 3 | Status: SHIPPED | OUTPATIENT
Start: 2024-10-15

## 2024-10-15 RX ORDER — IOPAMIDOL 755 MG/ML
75 INJECTION, SOLUTION INTRAVASCULAR
Status: COMPLETED | OUTPATIENT
Start: 2024-10-15 | End: 2024-10-15

## 2024-10-15 RX ADMIN — IOPAMIDOL 75 ML: 755 INJECTION, SOLUTION INTRAVENOUS at 12:39

## 2024-10-15 ASSESSMENT — LIFESTYLE VARIABLES
HOW MANY STANDARD DRINKS CONTAINING ALCOHOL DO YOU HAVE ON A TYPICAL DAY: 1 OR 2
HOW OFTEN DO YOU HAVE A DRINK CONTAINING ALCOHOL: 4 OR MORE TIMES A WEEK

## 2024-10-15 ASSESSMENT — PAIN - FUNCTIONAL ASSESSMENT: PAIN_FUNCTIONAL_ASSESSMENT: NONE - DENIES PAIN

## 2024-10-15 NOTE — DISCHARGE INSTRUCTIONS
Do not perform any heavy lifting. return immediately to the emergency department for passing out, chest pain, back pain, abdominal pain, worsening of the condition or any other concerns.

## 2024-10-15 NOTE — ED PROVIDER NOTES
eMERGENCY dEPARTMENT eNCOUnter      PtName: Frederick Stacy  MRN: [id number]  Birthdate 1960  Date of evaluation: 10/15/2024  Provider: BEBA ANDRADE DO     CHIEF COMPLAINT       Chief Complaint   Patient presents with    Back Pain     Incidental aortic aneurysm found on CT         HISTORY OF PRESENT ILLNESS   (Location/Symptom, Timing/Onset,Context/Setting, Quality, Duration, Modifying Factors, Severity) Note limiting factors.   HPI    Frederick Stacy is a 64 y.o. male who presents to the emergency department with chief complaint of abnormal finding on MRI and CTA abdomen pelvis.  He is been having months of lower back pain without radiation to the legs.  No loss of bowel or bladder.  Reports that he had an MRI of his lumbar spine where an aortic dissection was found.  CTA abdomen pelvis was performed and he was sent to the ER.  He denies any chest pain shortness of breath syncope or abdominal pain.  His back pain is right-sided lower without radiation no numbness or weakness of the extremities no bowel or bladder incontinence or saddle anesthesia.    Nursing Notes were reviewed.    REVIEW OF SYSTEMS    (2+ forlevel 4; 10+ for level 5)     Review of Systems  See hpi for further details. Complete 10 point review of all systems performed and is otherwise negative unless noted above.    PAST MEDICAL HISTORY     Past Medical History:   Diagnosis Date    Arthritis     Knee bilateral       SURGICAL HISTORY       Past Surgical History:   Procedure Laterality Date    KNEE ARTHROSCOPY Right     x2    KNEE ARTHROSCOPY Left     x1    SHOULDER ARTHROSCOPY Bilateral        CURRENT MEDICATIONS       Previous Medications    MAGNESIUM PO    Take by mouth    VITAMIN B-12 (CYANOCOBALAMIN) 100 MCG TABLET    Take 0.5 tablets by mouth daily    VITAMIN D PO    Take by mouth    ZINC SULFATE 66 MG TABS    Take by mouth       ALLERGIES     Patient has no known allergies.    FAMILY HISTORY       Family History   Problem Relation Age

## 2024-10-15 NOTE — TELEPHONE ENCOUNTER
Georgia with [facility] called and wanted to inform Dr. Boykin of aortic aneurysm that they discovered from pt MRI. She said they have not spoken with pt about this yet. [facility] recommends CT angiogram and they cannot rule out dissection or intermural hematoma. Please review and contact Georgia at [facility].

## 2024-10-15 NOTE — ED TRIAGE NOTES
Patient presents to ED for evaluation of abnormal CT scan findings. Patient had an MRI done to evaluate chronic back pain. Incidental findings of abdominal aortic aneurysm. Had CTA performed. Referred here by PCP. Patient denies any abdominal pain, denies chest pain or shortness of breath. Denies any lightheaded or dizziness.

## 2024-10-15 NOTE — TELEPHONE ENCOUNTER
Spoke with Dr. Ritter.  A Stat CTA abdomen/pelvis has been ordered.  Spoke with Doreen and the patient is advised as well.    Appt scheduled for today at [facility]

## 2024-10-16 ENCOUNTER — TELEPHONE (OUTPATIENT)
Dept: PRIMARY CARE CLINIC | Age: 64
End: 2024-10-16

## 2024-10-16 ENCOUNTER — PATIENT MESSAGE (OUTPATIENT)
Dept: PRIMARY CARE CLINIC | Age: 64
End: 2024-10-16

## 2024-10-16 ENCOUNTER — TELEPHONE (OUTPATIENT)
Dept: VASCULAR SURGERY | Age: 64
End: 2024-10-16

## 2024-10-16 DIAGNOSIS — I71.21 ANEURYSM OF ASCENDING AORTA WITHOUT RUPTURE (HCC): Primary | ICD-10-CM

## 2024-10-16 DIAGNOSIS — I71.02 DISSECTION OF ABDOMINAL AORTA (HCC): Primary | ICD-10-CM

## 2024-10-16 DIAGNOSIS — I71.40 ABDOMINAL AORTIC ANEURYSM (AAA), UNSPECIFIED PART, UNSPECIFIED WHETHER RUPTURED (HCC): ICD-10-CM

## 2024-10-16 LAB
EKG ATRIAL RATE: 66 BPM
EKG DIAGNOSIS: NORMAL
EKG P AXIS: 40 DEGREES
EKG P-R INTERVAL: 170 MS
EKG Q-T INTERVAL: 406 MS
EKG QRS DURATION: 98 MS
EKG QTC CALCULATION (BAZETT): 425 MS
EKG R AXIS: -27 DEGREES
EKG T AXIS: 43 DEGREES
EKG VENTRICULAR RATE: 66 BPM

## 2024-10-16 NOTE — TELEPHONE ENCOUNTER
Pt called and needs referral to Cardio-   Dr. Danita Lauren   Phone # [phone]  Fax# [phone]    Pls fax over as soon as possible and call pt when faxed over.

## 2024-10-16 NOTE — TELEPHONE ENCOUNTER
----- Message from Dr. Pippa Hoffman MD sent at 10/16/2024  7:32 AM EDT -----  Needs follow up appt with me.  He was in ED yesterday at [facility].   CTA chest, abdomen and pelvis prior to visit.    Should be seen in 7-10 days.  ----- Message -----  From: Dhaval Lyles DO  Sent: 10/16/2024   4:05 AM EDT  To: Pippa Hoffman MD

## 2024-10-21 SDOH — HEALTH STABILITY: PHYSICAL HEALTH: ON AVERAGE, HOW MANY DAYS PER WEEK DO YOU ENGAGE IN MODERATE TO STRENUOUS EXERCISE (LIKE A BRISK WALK)?: 4 DAYS

## 2024-10-21 SDOH — HEALTH STABILITY: PHYSICAL HEALTH: ON AVERAGE, HOW MANY MINUTES DO YOU ENGAGE IN EXERCISE AT THIS LEVEL?: 50 MIN

## 2024-10-24 ENCOUNTER — OFFICE VISIT (OUTPATIENT)
Dept: PRIMARY CARE CLINIC | Age: 64
End: 2024-10-24
Payer: COMMERCIAL

## 2024-10-24 VITALS
WEIGHT: 186 LBS | HEIGHT: 70 IN | OXYGEN SATURATION: 98 % | HEART RATE: 65 BPM | TEMPERATURE: 97.4 F | SYSTOLIC BLOOD PRESSURE: 162 MMHG | BODY MASS INDEX: 26.63 KG/M2 | DIASTOLIC BLOOD PRESSURE: 80 MMHG

## 2024-10-24 DIAGNOSIS — I71.02 DISSECTION OF ABDOMINAL AORTA (HCC): Primary | ICD-10-CM

## 2024-10-24 DIAGNOSIS — I10 HYPERTENSION, UNSPECIFIED TYPE: ICD-10-CM

## 2024-10-24 PROCEDURE — 3079F DIAST BP 80-89 MM HG: CPT | Performed by: FAMILY MEDICINE

## 2024-10-24 PROCEDURE — 99214 OFFICE O/P EST MOD 30 MIN: CPT | Performed by: FAMILY MEDICINE

## 2024-10-24 PROCEDURE — 3077F SYST BP >= 140 MM HG: CPT | Performed by: FAMILY MEDICINE

## 2024-10-24 RX ORDER — AMLODIPINE BESYLATE 10 MG/1
10 TABLET ORAL DAILY
Qty: 30 TABLET | Refills: 1 | Status: SHIPPED | OUTPATIENT
Start: 2024-10-24

## 2024-10-24 ASSESSMENT — ENCOUNTER SYMPTOMS
BACK PAIN: 1
GASTROINTESTINAL NEGATIVE: 1
RESPIRATORY NEGATIVE: 1
EYES NEGATIVE: 1

## 2024-10-24 NOTE — PROGRESS NOTES
this visit.     No Known Allergies    Review of Systems   Constitutional: Negative.    HENT: Negative.     Eyes: Negative.    Respiratory: Negative.     Cardiovascular: Negative.    Gastrointestinal: Negative.    Musculoskeletal:  Positive for arthralgias and back pain.       OBJECTIVE:  BP (!) 162/80   Pulse 65   Temp 97.4 °F (36.3 °C) (Temporal)   Ht 1.778 m (5' 10\")   Wt 84.4 kg (186 lb)   SpO2 98%   BMI 26.69 kg/m²     Physical Exam  Vitals reviewed.   Constitutional:       Appearance: He is well-developed.   HENT:      Right Ear: Tympanic membrane normal.   Eyes:      General: No scleral icterus.  Neck:      Thyroid: No thyromegaly.      Vascular: No carotid bruit or JVD.   Cardiovascular:      Rate and Rhythm: Normal rate and regular rhythm.      Heart sounds: Normal heart sounds. No murmur heard.  Pulmonary:      Effort: Pulmonary effort is normal. No respiratory distress.      Breath sounds: Normal breath sounds. No wheezing or rales.   Chest:      Chest wall: No tenderness.   Abdominal:      General: Bowel sounds are normal. There is no distension.      Palpations: Abdomen is soft. There is no mass.      Tenderness: There is no abdominal tenderness. There is no guarding or rebound.   Musculoskeletal:      Cervical back: Normal range of motion and neck supple.   Skin:     Findings: No rash.   Neurological:      Mental Status: He is alert and oriented to person, place, and time.         ASSESSMENT:  1. Dissection of abdominal aorta (HCC)  Comments:  Stable  Follow-up with vascular surgeon  Any changes symptoms may need to present to the emergency room  2. Hypertension, unspecified type  Comments:  And controlled  Increase amlodipine to 10 mg  Ambulatory blood pressure check  Nurse visit to check blood pressure/the machine        PLAN:    See orders  Follow-up with vascular surgeon  Follow-up with this testing  Close follow-up with [facility] blood pressure

## 2024-10-25 ENCOUNTER — NURSE ONLY (OUTPATIENT)
Dept: PRIMARY CARE CLINIC | Age: 64
End: 2024-10-25

## 2024-10-25 ENCOUNTER — HOSPITAL ENCOUNTER (OUTPATIENT)
Age: 64
Discharge: HOME | End: 2024-10-25
Payer: COMMERCIAL

## 2024-10-25 VITALS — SYSTOLIC BLOOD PRESSURE: 132 MMHG | DIASTOLIC BLOOD PRESSURE: 70 MMHG

## 2024-10-25 DIAGNOSIS — I71.21 ANEURYSM OF ASCENDING AORTA WITHOUT RUPTURE (HCC): ICD-10-CM

## 2024-10-25 PROCEDURE — 74174 CTA ABD&PLVS W/CONTRAST: CPT

## 2024-10-25 PROCEDURE — 6360000004 HC RX CONTRAST MEDICATION: Performed by: SURGERY

## 2024-10-25 RX ORDER — IOPAMIDOL 755 MG/ML
75 INJECTION, SOLUTION INTRAVASCULAR
Status: COMPLETED | OUTPATIENT
Start: 2024-10-25 | End: 2024-10-25

## 2024-10-25 RX ADMIN — IOPAMIDOL 75 ML: 755 INJECTION, SOLUTION INTRAVENOUS at 09:57

## 2024-10-29 ENCOUNTER — TELEPHONE (OUTPATIENT)
Dept: VASCULAR SURGERY | Age: 64
End: 2024-10-29

## 2024-10-29 NOTE — TELEPHONE ENCOUNTER
Patient called in wanting to going over the results of his CTA and be sure that  has been able to review the results as well    Please contact the patient at [phone]

## 2024-10-29 NOTE — TELEPHONE ENCOUNTER
Spoke with Mr. Stacy and informed him that Dr. Hoffman is out of town and will be in tomorrow  and would take a look at CTA scans, he is also scheduled for appt with Dr. Hoffman 10/31 at 9a.

## 2024-10-30 PROBLEM — M79.641 PAIN IN RIGHT HAND: Status: ACTIVE | Noted: 2018-10-22

## 2024-10-30 PROBLEM — M25.529 PAIN IN ELBOW: Status: ACTIVE | Noted: 2017-08-01

## 2024-10-30 PROBLEM — M25.571 ARTHRALGIA OF RIGHT ANKLE: Status: ACTIVE | Noted: 2024-10-30

## 2024-10-30 PROBLEM — M54.50 LOW BACK PAIN: Status: ACTIVE | Noted: 2019-07-31

## 2024-10-30 PROBLEM — Z82.69: Status: ACTIVE | Noted: 2023-10-13

## 2024-10-30 PROBLEM — M25.569 KNEE PAIN: Status: ACTIVE | Noted: 2024-10-30

## 2024-10-31 ENCOUNTER — OFFICE VISIT (OUTPATIENT)
Dept: VASCULAR SURGERY | Age: 64
End: 2024-10-31
Payer: COMMERCIAL

## 2024-10-31 VITALS
SYSTOLIC BLOOD PRESSURE: 125 MMHG | BODY MASS INDEX: 26.83 KG/M2 | HEART RATE: 66 BPM | DIASTOLIC BLOOD PRESSURE: 72 MMHG | WEIGHT: 187 LBS

## 2024-10-31 DIAGNOSIS — I10 HYPERTENSION, UNSPECIFIED TYPE: ICD-10-CM

## 2024-10-31 DIAGNOSIS — I71.02 DISSECTION OF ABDOMINAL AORTA (HCC): Primary | ICD-10-CM

## 2024-10-31 PROCEDURE — 3078F DIAST BP <80 MM HG: CPT | Performed by: SURGERY

## 2024-10-31 PROCEDURE — 3074F SYST BP LT 130 MM HG: CPT | Performed by: SURGERY

## 2024-10-31 PROCEDURE — 99205 OFFICE O/P NEW HI 60 MIN: CPT | Performed by: SURGERY

## 2024-10-31 RX ORDER — ATENOLOL 25 MG/1
25 TABLET ORAL DAILY
Qty: 30 TABLET | Refills: 3 | Status: SHIPPED | OUTPATIENT
Start: 2024-10-31

## 2024-10-31 NOTE — PROGRESS NOTES
[facility] Vascular Surgery  Pippa Hoffman MD, FACS, [facility]  [phone]    OUTPATIENT CONSULTATION    Date of Consultation:  10/31/2024    PCP:  Kim Ritter MD       Chief Complaint: Aortic dissection    History of Present Illness: Frederick Stacy is a 64 y.o. male who presents today for emergency room follow-up of incidentally found aortic dissection.  He was recently evaluated for back issues and had an MRI of his lumbar spine which instantly noted a distal aortic dissection.  He denies any abdominal or deep chronic back pain apart from his musculoskeletal complaints.  He is a relatively active, well-appearing 64-year-old gentleman who appears in good health.  He is active, still working.  He is a non-smoker.  No recent trauma.  No claudication symptoms of the extremities and no rest pain.  He now presents for further evaluation and management as indicated.  He was started on Norvasc in the hospital.    I personally reviewed images of the following study:  CTA CHEST ABDOMEN PELVIS W CONTRAST 10/28/2024      Past Medical History:  Past Medical History:   Diagnosis Date    Arthritis     Knee bilateral       Past Surgical History:  Past Surgical History:   Procedure Laterality Date    KNEE ARTHROPLASTY  2010    KNEE ARTHROSCOPY Right     x2    KNEE ARTHROSCOPY Left     x1    SHOULDER ARTHROSCOPY Bilateral        Home Medications:   Prior to Admission medications    Medication Sig Start Date End Date Taking? Authorizing Provider   amLODIPine (NORVASC) 10 MG tablet Take 1 tablet by mouth daily 10/24/24  Yes Kim Ritter MD   vitamin B-12 (CYANOCOBALAMIN) 100 MCG tablet Take 0.5 tablets by mouth daily   Yes Alexey Morales MD   Zinc Sulfate 66 MG TABS Take by mouth   Yes Alexey Morales MD   VITAMIN D PO Take by mouth   Yes Alexey Morales MD        Allergies:  Patient has no known allergies.     SocialHistory:    Social History     Socioeconomic History    Marital status:

## 2024-11-08 PROBLEM — I10 HYPERTENSION: Status: ACTIVE | Noted: 2024-11-08

## 2024-11-08 PROBLEM — I71.02 DISSECTION OF ABDOMINAL AORTA (HCC): Status: ACTIVE | Noted: 2024-11-08

## 2024-12-20 RX ORDER — AMLODIPINE BESYLATE 10 MG/1
10 TABLET ORAL DAILY
Qty: 30 TABLET | Refills: 1 | Status: SHIPPED | OUTPATIENT
Start: 2024-12-20

## 2024-12-20 NOTE — TELEPHONE ENCOUNTER
Medication:   Requested Prescriptions     Pending Prescriptions Disp Refills    amLODIPine (NORVASC) 10 MG tablet [Pharmacy Med Name: amLODIPine BESYLATE 10MG TAB] 30 tablet 1     Sig: TAKE 1 TABLET BY MOUTH DAILY     Last Filled:  10/24/2024    Last appt: 10/24/2024   Next appt: Visit date not found    Last OARRS:        No data to display

## 2025-02-01 ENCOUNTER — HOSPITAL ENCOUNTER (EMERGENCY)
Age: 65
Discharge: HOME | End: 2025-02-01
Attending: EMERGENCY MEDICINE
Payer: COMMERCIAL

## 2025-02-01 VITALS
HEIGHT: 71 IN | SYSTOLIC BLOOD PRESSURE: 124 MMHG | RESPIRATION RATE: 18 BRPM | OXYGEN SATURATION: 97 % | TEMPERATURE: 98 F | HEART RATE: 65 BPM | WEIGHT: 196.9 LBS | DIASTOLIC BLOOD PRESSURE: 76 MMHG | BODY MASS INDEX: 27.56 KG/M2

## 2025-02-01 DIAGNOSIS — M54.10 RADICULOPATHY, UNSPECIFIED SPINAL REGION: Primary | ICD-10-CM

## 2025-02-01 PROCEDURE — 99283 EMERGENCY DEPT VISIT LOW MDM: CPT

## 2025-02-01 RX ORDER — MELOXICAM 15 MG/1
15 TABLET ORAL DAILY
COMMUNITY
End: 2025-02-01 | Stop reason: HOSPADM

## 2025-02-01 RX ORDER — METHYLPREDNISOLONE 4 MG/1
TABLET ORAL
Qty: 1 KIT | Refills: 0 | Status: SHIPPED | OUTPATIENT
Start: 2025-02-01

## 2025-02-01 RX ORDER — METHOCARBAMOL 500 MG/1
500 TABLET, FILM COATED ORAL 4 TIMES DAILY
COMMUNITY

## 2025-02-01 RX ORDER — ACETAMINOPHEN 500 MG
500 TABLET ORAL EVERY 6 HOURS PRN
Qty: 30 TABLET | Refills: 0 | Status: SHIPPED | OUTPATIENT
Start: 2025-02-01

## 2025-02-01 ASSESSMENT — PAIN - FUNCTIONAL ASSESSMENT: PAIN_FUNCTIONAL_ASSESSMENT: 0-10

## 2025-02-01 ASSESSMENT — PAIN DESCRIPTION - ORIENTATION: ORIENTATION: RIGHT

## 2025-02-01 ASSESSMENT — PAIN DESCRIPTION - LOCATION: LOCATION: ARM;SHOULDER

## 2025-02-01 ASSESSMENT — PAIN SCALES - GENERAL: PAINLEVEL_OUTOF10: 8

## 2025-02-01 NOTE — ED TRIAGE NOTES
Right shoulder/arm pain for 2 weeks. Being seen at [place] and prescribed medication: Methocarbamol and Meloxicam.   Rt arm spasms and numbness down to fingers especially ring finger and pinky Denies dizziness or chest pain.

## 2025-02-01 NOTE — ED PROVIDER NOTES
SpO2 97%   BMI 27.46 kg/m²   GENERAL APPEARANCE: Awake and alert. Well appearing. No acute distress.  HEAD: Normocephalic. Atraumatic.  EYES: No scleral icterus  ENT: Mucous membranes are moist.   NECK: Supple. Normal ROM.  With certain movements I can reproduce the patient's pain by moving the patient's neck.  CHEST: Equal symmetric chest rise.  LUNGS: Breathing is unlabored. Speaking comfortably in full sentences.   EXTREMITIES: No deformities. Non-tender.  Right shoulder is not red or hot.  There is no rash.  There is no zoster.  Distal pulses are normal.  Compartments are soft.  SKIN: Warm and dry.  No rash  NEUROLOGICAL: Normal speech and thought.  I can reproduce the patient's symptoms by tapping over the patient's cubital tunnel.  Strength of the right is equivalent to the left.  Biceps and brachial radialis reflexes are equal bilaterally.      DIAGNOSTIC RESULTS   LABS:   Labs Reviewed - No data to display   When ordered only abnormal lab results are displayed. All other labs were within normal range or not returned as of this dictation.     EKG:      RADIOLOGY:    Non-plain film images such as CT, Ultrasound and MRI are read by the radiologist. Plain radiographic images are visualized and preliminarily interpreted by the ED Provider with the below findings:      Interpretation per the Radiologist below, if available at the time of this note:  No results found.         EMERGENCY DEPARTMENT COURSE and DIFFERENTIAL DIAGNOSIS/MDM:     Vitals:    Vitals:    02/01/25 1131 02/01/25 1202   BP: 133/78 124/76   Pulse: 65    Resp: 18    Temp: 98 °F (36.7 °C)    TempSrc: Oral    SpO2: 99% 97%   Weight: 89.3 kg (196 lb 14.4 oz)    Height: 1.803 m (5' 11\")         Patient was given the following medications:   Medications - No data to display          CC/HPI Summary, DDx, ED Course, and Reassessment: This patient has reproducible symptoms.  His strength is equivalent to the other side.  Reflexes are equal bilaterally.

## 2025-02-17 RX ORDER — AMLODIPINE BESYLATE 10 MG/1
10 TABLET ORAL DAILY
Qty: 30 TABLET | Refills: 1 | Status: SHIPPED | OUTPATIENT
Start: 2025-02-17

## 2025-02-17 NOTE — TELEPHONE ENCOUNTER
Medication:   Requested Prescriptions     Pending Prescriptions Disp Refills    amLODIPine (NORVASC) 10 MG tablet [Pharmacy Med Name: amLODIPine BESYLATE 10MG TAB] 30 tablet 1     Sig: TAKE 1 TABLET BY MOUTH DAILY     Last Filled:  12/20/2024    Last appt: 10/24/2024   Next appt: Visit date not found    Last OARRS:        No data to display

## 2025-02-25 RX ORDER — ATENOLOL 25 MG/1
25 TABLET ORAL DAILY
Qty: 90 TABLET | Refills: 2 | Status: SHIPPED | OUTPATIENT
Start: 2025-02-25

## 2025-04-22 RX ORDER — AMLODIPINE BESYLATE 10 MG/1
10 TABLET ORAL DAILY
Qty: 30 TABLET | Refills: 1 | Status: SHIPPED | OUTPATIENT
Start: 2025-04-22

## 2025-04-22 NOTE — TELEPHONE ENCOUNTER
Medication:   Requested Prescriptions     Pending Prescriptions Disp Refills    amLODIPine (NORVASC) 10 MG tablet [Pharmacy Med Name: amLODIPine BESYLATE 10MG TAB] 30 tablet 1     Sig: TAKE 1 TABLET BY MOUTH DAILY     Last Filled:  02/17/2025    Last appt: 10/24/2024   Next appt: Visit date not found    Last OARRS:        No data to display

## 2025-05-22 NOTE — TELEPHONE ENCOUNTER
Patient returned call and schedule an appointment on 06/06 as per availability of Dr Ritter.    Patient stated that he is out of the medication and requested partial refill of his blood pressure medication amLODIPine (NORVASC) 10 MG .    The pharmacy to send     [id number] - [place], [place] - [address] - P [phone] - F [phone] [address], [facility] [address]  Phone: [phone]  Fax: [phone]     He also requested to let him know when the prescription is send in    Pl review    thanks

## 2025-05-22 NOTE — TELEPHONE ENCOUNTER
Medication:   Requested Prescriptions     Pending Prescriptions Disp Refills    amLODIPine (NORVASC) 10 MG tablet [Pharmacy Med Name: amLODIPine BESYLATE 10MG TAB] 30 tablet 1     Sig: TAKE 1 TABLET BY MOUTH DAILY     Last Filled:  4.22.25    Last appt: 10/24/2024   Next appt: Visit date not found  Left message for return call.  Due for ov    Last OARRS:        No data to display

## 2025-05-23 RX ORDER — AMLODIPINE BESYLATE 10 MG/1
10 TABLET ORAL DAILY
Qty: 30 TABLET | Refills: 0 | Status: SHIPPED | OUTPATIENT
Start: 2025-05-23